# Patient Record
Sex: MALE | Race: WHITE | NOT HISPANIC OR LATINO | Employment: FULL TIME | URBAN - METROPOLITAN AREA
[De-identification: names, ages, dates, MRNs, and addresses within clinical notes are randomized per-mention and may not be internally consistent; named-entity substitution may affect disease eponyms.]

---

## 2020-11-12 ENCOUNTER — VIRTUAL VISIT (OUTPATIENT)
Dept: FAMILY MEDICINE | Facility: OTHER | Age: 55
End: 2020-11-12
Payer: COMMERCIAL

## 2020-11-12 DIAGNOSIS — Z20.822 SUSPECTED COVID-19 VIRUS INFECTION: Primary | ICD-10-CM

## 2020-11-12 PROCEDURE — 99421 OL DIG E/M SVC 5-10 MIN: CPT | Performed by: FAMILY MEDICINE

## 2020-11-12 NOTE — PROGRESS NOTES
"Date: 2020 08:49:11  Clinician: Meng Ko  Clinician NPI: [id number]  Patient: thanh burgos  Patient : 1965  Patient Address: [address] [place] [address] [place] [address]  Patient Phone: [phone]  Visit Protocol: URI  Patient Summary:  thanh is a 54 year old ( : 1965 ) male who initiated a [facility] Visit for COVID-19 (Coronavirus) evaluation and screening. When asked the question \"Please sign me up to receive news, health information and promotions from [facility].\", thanh responded \"Yes\".    When asked when his symptoms started, thanh reported that he does not have any symptoms.   He denies having recent facial or sinus surgery in the past 60 days.    Pertinent COVID-19 (Coronavirus) information  thanh does not work or volunteer as healthcare worker or a . In the past 14 days, thanh has not worked or volunteered at a healthcare facility or group living setting.   In the past 14 days, he also has not lived in a congregate living setting.   thanh has had a close contact with a laboratory-confirmed COVID-19 patient in the last 14 days. He was exposed at his work. Date thanh was exposed to the laboratory-confirmed COVID-19 patient: 2020   Additional information about contact with COVID-19 (Coronavirus) patient as reported by the patient (free text): infected person came into the lunchroom without a mask while I was eating   thanh is not living in the same household with the COVID-19 positive patient. He was in an enclosed space for greater than 15 minutes with the COVID-19 patient.   During the encounter, neither were wearing masks.   Since 2019, thanh has not been tested for COVID-19 and has not had upper respiratory infection or influenza-like illness.   Pertinent medical history  thanh has taken an antibiotic medication in the past month. Antibiotic details as reported by the patient (free text): amoxicillin   thanh does not need a return to work/school note.   Weight: 168 lbs   thanh does " not smoke or use smokeless tobacco.   Weight: 168 lbs    MEDICATIONS: amoxicillin oral, ALLERGIES: NKDA  Clinician Response:  Dear thanh,   Based on your exposure to COVID-19 (coronavirus), we would like to test you for this virus.  1. Please call [phone] to schedule your visit. Explain that you were referred by [facility] to have a COVID-19 test. Be [facility].  * If you need to schedule in [facility] please call [phone] or for [employer] employees please call [phone].   * If you need to schedule in the [place] area please call [phone]. [place] employees call [phone].   The following will serve as your written order for this COVID Test, ordered by me, for the indication of suspected COVID [Z20.828]: The test will be ordered in [facility], our electronic health record, after you are scheduled. It will show as ordered and authorized by Ga Vera MD.  Order: COVID-19 (coronavirus) PCR for ASYMPTOMATIC EXPOSURE testing from [facility].   If you know you have had close contact with someone who tested positive, you should be quarantined for 14 days after this exposure. You should stay in quarantine for the14 days even if the covid test is negative, the optimal time to test after exposure is 5-7 days from the exposure  Quarantine means   What should I do?  For safety, it's very important to follow these rules. Do this for 14 days after the date you were last exposed to the virus..  Stay home and away from others. Don't go to school or anywhere else. Generally quarantine means staying home from work but there are some exceptions to this. Please contact your workplace.   No hugging, kissing or shaking hands.  Don't let anyone visit.  Cover your mouth and nose with a mask, tissue or washcloth to avoid spreading germs.  Wash your hands and face often. Use soap and water.  What are the symptoms of COVID-19?  The most common symptoms are cough, fever and trouble breathing. Less common  symptoms include headache, body aches, fatigue (feeling very tired), chills, sore throat, stuffy or runny nose, diarrhea (loose poop), loss of taste or smell, belly pain, and nausea or vomiting (feeling sick to your stomach or throwing up).  After 14 days, if you have still don't have symptoms, you likely don't have this virus.  If you develop symptoms, follow these guidelines.  If you're normally healthy: Please start another [facility] visit to report your symptoms. Go to [email or web address].  If you have a serious health problem (like cancer, heart failure, an organ transplant or kidney disease): Call your specialty clinic. Let them know that you might have COVID-19.  2. When it's time for your COVID test:  Stay at least 6 feet away from others. (If someone will drive you to your test, stay in the backseat, as far away from the  as you can.)  Cover your mouth and nose with a mask, tissue or washcloth.  Go straight to the testing site. Don't make any stops on the way there or back.  Please note  Caregivers in these groups are at risk for severe illness due to COVID-19:  o People 65 years and older  o People who live in a nursing home or long-term care facility  o People with chronic disease (lung, heart, cancer, diabetes, kidney, liver, immunologic)  o People who have a weakened immune system, including those who:  Are in cancer treatment  Take medicine that weakens the immune system, such as corticosteroids  Had a bone marrow or organ transplant  Have an immune deficiency  Have poorly controlled HIV or AIDS  Are obese (body mass index of 40 or higher)  Smoke regularly  Where can I get more information?   [employer] -- About COVID-19: [email or web address]  CDC -- What to Do If You're Sick: www.cdc.gov/coronavirus/2019-ncov/about/steps-when-sick.html  CDC -- Ending Home Isolation: www.cdc.gov/coronavirus/2019-ncov/hcp/disposition-in-home-patients.html  CDC -- Caring for Someone:  www.cdc.gov/coronavirus/2019-ncov/if-you-are-sick/care-for-someone.html  [facility] -- Interim Guidance for Hospital Discharge to Home: www.health.[facility].[place]/diseases/coronavirus/hcp/hospdischarge.pdf  [facility] clinical trials (COVID-19 research studies): clinicalaffairs.[facility]-clinical-trials  Below are the COVID-19 hotlines at the [place] Department of Health ([facility]). Interpreters are available.  For health questions: Call 888-180-0735 or 1-107.203.6057 (7 a.m. to 7 p.m.)  For questions about schools and childcare: Call 365-937-5997 or 1-998.673.6858 (7 a.m. to 7 p.m.)    Diagnosis: Contact with and (suspected) exposure to other viral communicable diseases  Diagnosis ICD: Z20.828

## 2020-11-13 DIAGNOSIS — Z20.822 SUSPECTED COVID-19 VIRUS INFECTION: ICD-10-CM

## 2020-11-13 DIAGNOSIS — Z20.822 SUSPECTED 2019 NOVEL CORONAVIRUS INFECTION: Primary | ICD-10-CM

## 2020-11-13 PROCEDURE — U0003 INFECTIOUS AGENT DETECTION BY NUCLEIC ACID (DNA OR RNA); SEVERE ACUTE RESPIRATORY SYNDROME CORONAVIRUS 2 (SARS-COV-2) (CORONAVIRUS DISEASE [COVID-19]), AMPLIFIED PROBE TECHNIQUE, MAKING USE OF HIGH THROUGHPUT TECHNOLOGIES AS DESCRIBED BY CMS-2020-01-R: HCPCS | Performed by: FAMILY MEDICINE

## 2020-11-14 LAB
SARS-COV-2 RNA SPEC QL NAA+PROBE: NOT DETECTED
SPECIMEN SOURCE: NORMAL

## 2021-04-15 ENCOUNTER — NURSE TRIAGE (OUTPATIENT)
Dept: NURSING | Facility: CLINIC | Age: 56
End: 2021-04-15

## 2021-04-15 NOTE — TELEPHONE ENCOUNTER
Urinary issue. Unable to empty bladder. This started about two weeks ago. He can go some but is unable to empty bladder, still feels like there's urine in there.   He will go to the ER after work today. I explained, that is where they have the urinary catheters for draining his bladder.  Leslie Payne RN  [facility]      Reason for Disposition    [1] Unable to urinate (or only a few drops) > 4 hours AND     [2] bladder feels very full (e.g., palpable bladder or strong urge to urinate)    Additional Information    Negative: Shock suspected (e.g., cold/pale/clammy skin, too weak to stand, low BP, rapid pulse)    Negative: Sounds like a life-threatening emergency to the triager    Negative: Followed a genital area injury    Negative: Followed a genital area injury (penis, scrotum)    Negative: Vaginal discharge    Negative: Pus (white, yellow) or bloody discharge from end of penis    Negative: [1] Taking antibiotic for urinary tract infection (UTI) AND [2] female    Negative: [1] Taking antibiotic for urinary tract infection (UTI) AND [2] male    Negative: [1] Discomfort (pain, burning or stinging) when passing urine AND [2] pregnant    Negative: [1] Discomfort (pain, burning or stinging) when passing urine AND [2] postpartum (from 0 to 6 weeks after delivery)    Negative: [1] Discomfort (pain, burning or stinging) when passing urine AND [2] female    Negative: [1] Discomfort (pain, burning or stinging) when passing urine AND [2] male    Negative: Pain or itching in the vulvar area    Negative: Pain in scrotum is main symptom    Negative: Blood in the urine is main symptom    Negative: Symptoms arising from use of a urinary catheter (Vasques or Coude)    Protocols used: URINARY SYMPTOMS-A-AH

## 2021-04-16 ENCOUNTER — HOSPITAL ENCOUNTER (EMERGENCY)
Facility: CLINIC | Age: 56
Discharge: HOME | End: 2021-04-16
Attending: NURSE PRACTITIONER | Admitting: NURSE PRACTITIONER
Payer: COMMERCIAL

## 2021-04-16 VITALS
TEMPERATURE: 97.8 F | SYSTOLIC BLOOD PRESSURE: 125 MMHG | HEART RATE: 62 BPM | RESPIRATION RATE: 16 BRPM | DIASTOLIC BLOOD PRESSURE: 75 MMHG

## 2021-04-16 DIAGNOSIS — R35.0 URINARY FREQUENCY: ICD-10-CM

## 2021-04-16 DIAGNOSIS — N40.0 PROSTATE HYPERTROPHY: ICD-10-CM

## 2021-04-16 PROBLEM — F60.6 AVOIDANT PERSONALITY DISORDER (H): Status: ACTIVE | Noted: 2018-06-08

## 2021-04-16 PROBLEM — F33.0 MAJOR DEPRESSIVE DISORDER, RECURRENT EPISODE, MILD (H): Status: ACTIVE | Noted: 2018-01-29

## 2021-04-16 PROBLEM — F41.9 ANXIETY DISORDER: Status: ACTIVE | Noted: 2018-01-29

## 2021-04-16 PROBLEM — F40.10 SOCIAL ANXIETY DISORDER: Status: ACTIVE | Noted: 2018-06-08

## 2021-04-16 LAB
ALBUMIN UR-MCNC: NEGATIVE MG/DL
ANION GAP SERPL CALCULATED.3IONS-SCNC: 2 MMOL/L (ref 3–14)
APPEARANCE UR: CLEAR
BASOPHILS # BLD AUTO: 0 10E9/L (ref 0–0.2)
BASOPHILS NFR BLD AUTO: 0.3 %
BILIRUB UR QL STRIP: NEGATIVE
BUN SERPL-MCNC: 21 MG/DL (ref 7–30)
CALCIUM SERPL-MCNC: 9 MG/DL (ref 8.5–10.1)
CHLORIDE SERPL-SCNC: 107 MMOL/L (ref 94–109)
CO2 SERPL-SCNC: 30 MMOL/L (ref 20–32)
COLOR UR AUTO: NORMAL
CREAT SERPL-MCNC: 1.12 MG/DL (ref 0.66–1.25)
DIFFERENTIAL METHOD BLD: NORMAL
EOSINOPHIL # BLD AUTO: 0 10E9/L (ref 0–0.7)
EOSINOPHIL NFR BLD AUTO: 0.4 %
ERYTHROCYTE [DISTWIDTH] IN BLOOD BY AUTOMATED COUNT: 13.2 % (ref 10–15)
GFR SERPL CREATININE-BSD FRML MDRD: 73 ML/MIN/{1.73_M2}
GLUCOSE SERPL-MCNC: 86 MG/DL (ref 70–99)
GLUCOSE UR STRIP-MCNC: NEGATIVE MG/DL
HCT VFR BLD AUTO: 41.3 % (ref 40–53)
HGB BLD-MCNC: 13.9 G/DL (ref 13.3–17.7)
HGB UR QL STRIP: NEGATIVE
IMM GRANULOCYTES # BLD: 0 10E9/L (ref 0–0.4)
IMM GRANULOCYTES NFR BLD: 0.3 %
KETONES UR STRIP-MCNC: NEGATIVE MG/DL
LEUKOCYTE ESTERASE UR QL STRIP: NEGATIVE
LYMPHOCYTES # BLD AUTO: 3.4 10E9/L (ref 0.8–5.3)
LYMPHOCYTES NFR BLD AUTO: 45.2 %
MCH RBC QN AUTO: 30.8 PG (ref 26.5–33)
MCHC RBC AUTO-ENTMCNC: 33.7 G/DL (ref 31.5–36.5)
MCV RBC AUTO: 92 FL (ref 78–100)
MONOCYTES # BLD AUTO: 0.6 10E9/L (ref 0–1.3)
MONOCYTES NFR BLD AUTO: 7.7 %
NEUTROPHILS # BLD AUTO: 3.5 10E9/L (ref 1.6–8.3)
NEUTROPHILS NFR BLD AUTO: 46.1 %
NITRATE UR QL: NEGATIVE
NRBC # BLD AUTO: 0 10*3/UL
NRBC BLD AUTO-RTO: 0 /100
PH UR STRIP: 6.5 PH (ref 5–7)
PLATELET # BLD AUTO: 237 10E9/L (ref 150–450)
POTASSIUM SERPL-SCNC: 3.8 MMOL/L (ref 3.4–5.3)
RBC # BLD AUTO: 4.51 10E12/L (ref 4.4–5.9)
RBC #/AREA URNS AUTO: 1 /HPF (ref 0–2)
SODIUM SERPL-SCNC: 139 MMOL/L (ref 133–144)
SOURCE: NORMAL
SP GR UR STRIP: 1.01 (ref 1–1.03)
SQUAMOUS #/AREA URNS AUTO: 0 /HPF (ref 0–1)
UROBILINOGEN UR STRIP-MCNC: 0 MG/DL (ref 0–2)
WBC # BLD AUTO: 7.5 10E9/L (ref 4–11)
WBC #/AREA URNS AUTO: <1 /HPF (ref 0–5)

## 2021-04-16 PROCEDURE — 99283 EMERGENCY DEPT VISIT LOW MDM: CPT

## 2021-04-16 PROCEDURE — 80048 BASIC METABOLIC PNL TOTAL CA: CPT | Performed by: NURSE PRACTITIONER

## 2021-04-16 PROCEDURE — 81001 URINALYSIS AUTO W/SCOPE: CPT | Performed by: EMERGENCY MEDICINE

## 2021-04-16 PROCEDURE — 51798 US URINE CAPACITY MEASURE: CPT

## 2021-04-16 PROCEDURE — 85025 COMPLETE CBC W/AUTO DIFF WBC: CPT | Performed by: NURSE PRACTITIONER

## 2021-04-16 RX ORDER — TAMSULOSIN HYDROCHLORIDE 0.4 MG/1
0.4 CAPSULE ORAL DAILY
Qty: 10 CAPSULE | Refills: 0 | Status: SHIPPED | OUTPATIENT
Start: 2021-04-16 | End: 2021-04-26

## 2021-04-16 ASSESSMENT — ENCOUNTER SYMPTOMS
DIARRHEA: 0
NAUSEA: 0
FEVER: 0
VOMITING: 0
CHILLS: 0
DYSURIA: 0

## 2021-04-16 NOTE — ED NOTES
"Patient straight cathed for 200ml. Stated the catheter, \"made me feel the same feeling I have had at home. Like I want to pee and need to pee but nothing comes out.\"   "

## 2021-04-16 NOTE — ED PROVIDER NOTES
History   Chief Complaint:  Urinary frequency       HPI   Pablo Ferrari is a 55 year old male with history of anxiety and depression who presents with urinary frequency. For approximately the past 10 days, the patient states he has been experiencing urinary frequency, stating that he is only able to eliminate small amounts of urine at a time and feels like he must urinate frequently and that he does not empty his bladder.  He does confirm nocturia.  He does note this has had a gradual onset over the last few months but has worsened over the last week to 2 weeks.  He reports that he has had a similar episode in the past but reports that it only lasted for one day. He denies any nausea, vomiting, diarrhea, fever, chills, dysuria, or any other symptoms.  He denies risk for sexually transmitted infection and denies penile discharge or rash.    Review of Systems   Constitutional: Negative for chills and fever.   Gastrointestinal: Negative for diarrhea, nausea and vomiting.   Genitourinary: Positive for decreased urine volume. Negative for dysuria.   All other systems reviewed and are negative.    Allergies:  The patient has no known allergies.     Medications:  Omeprazole    Past Medical History:    Social anxiety disorder  Avoidant personality disorder  Major depressive disorder  Anxiety   Caries  Gum disease    Social History:  The patient presents alone and denies any drug use.    Physical Exam     Patient Vitals for the past 24 hrs:   BP Temp Temp src Pulse Resp   04/16/21 1415 121/73 97.8  F (36.6  C) Temporal 62 16       Physical Exam  Nursing notes reviewed. Vitals reviewed.  General: Alert. Well kept.  Eyes:  Conjunctiva non-injected, non-icteric.  Neck/Throat: Moist mucous membranes. Normal voice.  Cardiac: Regular rhythm. Normal heart sounds.  Pulmonary: Clear and equal breath sounds bilaterally.   Abdomen: soft and non-tender  : evaluation performed in presence of chaperone.  Normal rectal tone.  Enlarged  nontender prostate.  Musculoskeletal: Normal gross range of motion of all 4 extremities.   Neurological: Alert and oriented x4.   Skin: Warm and dry. Normal appearance of visualized exposed skin without rashes or petechiae.  Psych: Affect normal. Good eye contact.    Emergency Department Course     Laboratory:     CBC: WBC 7.5, HGB 13.9,      BMP: Anion Gap 2 (L), o/w WNL (Creatinine 1.12)     UA with microscopic:  AWNL     Emergency Department Course:    Reviewed:  I reviewed nursing notes, vitals, past medical history and care everywhere.    Assessments:  1600 I obtained history and examined the patient as noted above.     1644 A nurse informed me that the patient received a straight catheter for 200 mL.    1712 I rechecked the patient and explained findings.     Disposition:  The patient was discharged to home.       Impression & Plan   Medical Decision Making:  Pablo Ferrari is a 55 year old male with history of anxiety and depression who presents with urinary frequency.  Urinalysis today shows no sign of infection.  BMP shows normal kidney function.  Patient has an enlarged prostate without tenderness or signs of prostatitis on digital rectal exam.  Post void residual bladder scan is less than 60 cc.  Patient was straight cathed for 200 cc of urine sent for evaluation.  The patient has had gradual onset of symptoms with worsening over the last 1 to 2 weeks.  There is no indication for indwelling Vasques catheter at this time with post void residual less than 100 cc.  Patient will be started on alpha 1 blocker and was referred to urology.  Patient will return to the emergency department with no urination in 6 hours, fever, pelvic pain, concerns.    Covid-19  Pablo Ferrari was evaluated during a global COVID-19 pandemic, which necessitated consideration that the patient might be at risk for infection with the SARS-CoV-2 virus that causes COVID-19.   Applicable protocols for evaluation were followed during the  patient's care.       Diagnosis:    ICD-10-CM    1. Urinary frequency  R35.0    2. Prostate hypertrophy  N40.0      Discharge Medications:  New Prescriptions    TAMSULOSIN (FLOMAX) 0.4 MG CAPSULE    Take 1 capsule (0.4 mg) by mouth daily for 10 doses       Scribe Disclosure:  Dustin CALDERON, am serving as a scribe at 4:00 PM on 4/16/2021 to document services personally performed by Ciara Walsh CNP based on my observations and the provider's statements to me.            [place], Ciara, CNP  04/16/21 9978

## 2021-05-17 ENCOUNTER — HOSPITAL ENCOUNTER (EMERGENCY)
Facility: CLINIC | Age: 56
Discharge: HOME | End: 2021-05-17
Attending: EMERGENCY MEDICINE | Admitting: EMERGENCY MEDICINE
Payer: COMMERCIAL

## 2021-05-17 VITALS
DIASTOLIC BLOOD PRESSURE: 74 MMHG | OXYGEN SATURATION: 99 % | TEMPERATURE: 97.7 F | SYSTOLIC BLOOD PRESSURE: 107 MMHG | HEART RATE: 72 BPM | RESPIRATION RATE: 18 BRPM

## 2021-05-17 DIAGNOSIS — R39.9 LOWER URINARY TRACT SYMPTOMS (LUTS): ICD-10-CM

## 2021-05-17 LAB
ALBUMIN UR-MCNC: NEGATIVE MG/DL
APPEARANCE UR: CLEAR
BILIRUB UR QL STRIP: NEGATIVE
COLOR UR AUTO: ABNORMAL
GLUCOSE UR STRIP-MCNC: NEGATIVE MG/DL
HGB UR QL STRIP: NEGATIVE
KETONES UR STRIP-MCNC: NEGATIVE MG/DL
LEUKOCYTE ESTERASE UR QL STRIP: NEGATIVE
NITRATE UR QL: NEGATIVE
PH UR STRIP: 7.5 PH (ref 5–7)
RBC #/AREA URNS AUTO: 0 /HPF (ref 0–2)
SOURCE: ABNORMAL
SP GR UR STRIP: 1 (ref 1–1.03)
SQUAMOUS #/AREA URNS AUTO: 0 /HPF (ref 0–1)
UROBILINOGEN UR STRIP-MCNC: 0 MG/DL (ref 0–2)
WBC #/AREA URNS AUTO: 0 /HPF (ref 0–5)

## 2021-05-17 PROCEDURE — 87591 N.GONORRHOEAE DNA AMP PROB: CPT | Performed by: EMERGENCY MEDICINE

## 2021-05-17 PROCEDURE — 81001 URINALYSIS AUTO W/SCOPE: CPT | Performed by: EMERGENCY MEDICINE

## 2021-05-17 PROCEDURE — 87661 TRICHOMONAS VAGINALIS AMPLIF: CPT | Performed by: EMERGENCY MEDICINE

## 2021-05-17 PROCEDURE — 99284 EMERGENCY DEPT VISIT MOD MDM: CPT | Mod: 25

## 2021-05-17 PROCEDURE — 87491 CHLMYD TRACH DNA AMP PROBE: CPT | Performed by: EMERGENCY MEDICINE

## 2021-05-17 PROCEDURE — 87086 URINE CULTURE/COLONY COUNT: CPT | Performed by: EMERGENCY MEDICINE

## 2021-05-17 PROCEDURE — 51798 US URINE CAPACITY MEASURE: CPT

## 2021-05-17 RX ORDER — SULFAMETHOXAZOLE/TRIMETHOPRIM 800-160 MG
1 TABLET ORAL 2 TIMES DAILY
Qty: 28 TABLET | Refills: 0 | Status: SHIPPED | OUTPATIENT
Start: 2021-05-17 | End: 2021-05-31

## 2021-05-17 ASSESSMENT — ENCOUNTER SYMPTOMS
FREQUENCY: 1
BACK PAIN: 0
HEMATURIA: 0
DIFFICULTY URINATING: 1
DIARRHEA: 0
NAUSEA: 0
DYSURIA: 0
WEAKNESS: 0
FEVER: 0
VOMITING: 0
FLANK PAIN: 0
ABDOMINAL PAIN: 0
CHILLS: 0
NUMBNESS: 0

## 2021-05-17 NOTE — ED PROVIDER NOTES
History   Chief Complaint:  Urinary Retention     The history is provided by the patient and medical records.      Pablo Ferrari is a 55 year old male who presents with urinary retention. To note, the patient was seen here about a month ago for urinary frequency but difficulty voiding fully. He did not have a UTI at this time and was prescribed a dose of Flomax, which he believes improved his symptoms. His symptoms were better for a week or two then re-developed, though he initially thought it was a pulled muscle. Since then, he continues to feel the urge to urinate often, though has been having troubles voiding. He also complains of some pain in his groin and intermittent pain in his testicles; worse at night. Due to the persisting and worsening nature of his pain and symptoms, he decided to seek evaluation in the ED.     Patient denies fever, chills, back pain, abdominal pain, nausea, vomiting, diarrhea, painful bowels, numbness, tingling, or weakness in his legs, or being on an antibiotic recently. The patient denies being sexually active or chance of STI. To note, the patient has an appointment with the urologist in the next 2-3 weeks.     Review of Systems   Constitutional: Negative for chills and fever.   Gastrointestinal: Negative for abdominal pain, diarrhea, nausea and vomiting.   Genitourinary: Positive for decreased urine volume, difficulty urinating, frequency and testicular pain. Negative for dysuria, flank pain and hematuria.   Musculoskeletal: Negative for back pain.   Neurological: Negative for weakness and numbness.   All other systems reviewed and are negative.      Allergies:  No Known Allergies    Medications:  The patient is not on any daily medications.     Past Medical History:    Avoidant personality disorder  Social anxiety disorder  Anxiety disorder  Depression       Social History:  Smoking status: Negative  Alcohol use: Positive  Drug use: Negative   Marital Status: Single  Presents to the  ED alone.     Physical Exam     Patient Vitals for the past 24 hrs:   BP Temp Temp src Pulse Resp SpO2   21 1231 114/72 97.7  F (36.5  C) Temporal 79 18 99 %       Physical Exam  Constitutional: Well developed, nontox appearance  Head: Atraumatic.  Neck:  no stridor  Eyes: no scleral icterus  Cardiovascular: RRR, 2+ bilat radial pulses  Pulmonary/Chest: nml resp effort, Clear BS bilat  Abdominal: ND, soft, minimal suprapubic tenderness, no rebound or guarding   : no CVA tenderness bilat  Ext: Warm, well perfused, no edema  Neurological: A&O, symmetric facies, moves ext x4  Skin: Skin is warm and dry.   Psychiatric: Behavior is normal. Thought content normal.   Nursing note and vitals reviewed.    Emergency Department Course   Laboratory:  UA: pH: 7.5 (H), o/w Negative     Urine Culture: Pending    Trichomonas vaginalis PCR: Pending  Chlamydia Trachomatis: Pending  Neisseria Gonorrhea: Pending    Emergency Department Course:    Reviewed:  I reviewed nursing notes and vitals    Assessments:  1256 I obtained history and examined the patient as noted above. The patient was found to have 113 mL of urine in his bladder.     1421 I rechecked the patient and discussed the results of his workup thus far.     Disposition:  The patient was discharged to home.     Impression & Plan   Medical Decision Makin year old male presenting w/ LUTS, suprapubic discomfort     Patient differential diagnosis includes prostatitis (bacterial vs nonbacterial), UTI, urinary retention, LUTS, abd pain NOS.  Rectal and prostate exam deferred given it was done at previous visit and noted to be enlarged.  Doubt spinal cord compromise or cauda equina syndrome.  Bladder scan postvoid residual demonstrated urinary retention (volume greater than 100 mL) but indwelling Vasques catheter is not indicated at this time.  Given no severe urinary retention, do not feel labs are indicated at this time particular given vital signs are within normal  limits and the patient is afebrile.  UA as noted above without significant abnormality.  Is possible the patient is experiencing prostatitis given his constellation of symptoms and this is seemingly more likely nonbacterial.  STI testing added on and pending.  I think would be reasonable to trial the patient on Bactrim given his duration and and persistence of symptoms despite infectious prostatitis being less likely.  He is advised to continue Flomax and follow-up with urology as previously instructed.  Patient reports that he has urology appointment at the beginning of June.  At this time I feel the pt is safe for discharge.  Recommendations given regarding follow up with PCP and return to the emergency department as needed for new or worsening symptoms.  Pt counseled on all results, disposition and diagnosis.  They are understanding and agreeable to plan. Patient discharged in stable condition.      Diagnosis:    ICD-10-CM    1. Lower urinary tract symptoms (LUTS)  R39.9        Discharge Medications:  New Prescriptions    SULFAMETHOXAZOLE-TRIMETHOPRIM (BACTRIM DS) 800-160 MG TABLET    Take 1 tablet by mouth 2 times daily for 14 days       Scribe Disclosure:  IAspen, am serving as a scribe on 5/17/2021 at 12:56 PM to personally document services performed by Abdulkadir Desir MD based on my observations and the provider's statements to me.      Abdulkadir Desir MD  05/18/21 0994

## 2021-05-17 NOTE — ED TRIAGE NOTES
Seen here 2-3 weeks ago for similar thing; pain has gotten worse; voided before coming, but unable to empty bladder with pelvic discomfort

## 2021-05-17 NOTE — DISCHARGE INSTRUCTIONS
Please take antibiotics as prescribed.  Please continue to take your Flomax as previously prescribed.    Please follow-up with urology as scheduled.    Please return to the emergency department as needed for new or worsening symptoms including severe and uncontrolled pain, fever greater than 100.4  F, vomiting and unable to keep anything down, any other concerning symptoms.     -Take acetaminophen 500 to 1000 mg by mouth every 4 to 6 hours as needed for pain or fever.  Do not take more than 4000 mg in 24 hours.  Do not take within 6 hours of another acetaminophen containing medication such as norco (vicodin) or percocet.  - Take ibuprofen 600 to 800 mg by mouth every 6 to 8 hours as needed for pain or fever for 1 week.  If he can regularly, please take an over-the-counter oral antiacid such as pantoprazole daily.

## 2021-05-18 LAB
BACTERIA SPEC CULT: NO GROWTH
C TRACH DNA SPEC QL NAA+PROBE: NEGATIVE
Lab: NORMAL
N GONORRHOEA DNA SPEC QL NAA+PROBE: NEGATIVE
SPECIMEN SOURCE: NORMAL
T VAGINALIS DNA SPEC QL NAA+PROBE: NORMAL

## 2021-05-18 NOTE — RESULT ENCOUNTER NOTE
"Final urine culture report shows \"NO GROWTH\" and is NEGATIVE.  [facility] Emergency Dept discharge antibiotic: Sulfamethoxazole-Trimethoprim (Bactrim DS, Septra DS) 800-160 mg PO tablet, 1 tablet by mouth 2 times daily for 14 days  [facility] Emergency Dept discharge Rx antibiotic for UTI only (Yes/No): No  Patient took antibiotic within 3 days prior to urine culture collection (Yes/no): NA  Recommendations in treatment per [facility] ED Lab result Urine culture protocol.  "

## 2021-05-18 NOTE — RESULT ENCOUNTER NOTE
Final result for both N. Gonorrhoeae PCR and Chlamydia Trachomatis PCR are NEGATIVE.  No treatment or change in treatment per [facility] ED Lab Result N. Gonorrhea AND/OR Chlamydia T. protocol.

## 2021-09-07 ENCOUNTER — FCC EXTENDED DOCUMENTATION (OUTPATIENT)
Dept: PSYCHOLOGY | Facility: CLINIC | Age: 56
End: 2021-09-07

## 2021-09-07 ENCOUNTER — VIRTUAL VISIT (OUTPATIENT)
Dept: PSYCHOLOGY | Facility: CLINIC | Age: 56
End: 2021-09-07
Payer: COMMERCIAL

## 2021-09-07 DIAGNOSIS — F41.1 GENERALIZED ANXIETY DISORDER: ICD-10-CM

## 2021-09-07 DIAGNOSIS — F34.1: Primary | ICD-10-CM

## 2021-09-07 DIAGNOSIS — Z13.39 ATTENTION DEFICIT HYPERACTIVITY DISORDER (ADHD) EVALUATION: ICD-10-CM

## 2021-09-07 PROCEDURE — 90834 PSYTX W PT 45 MINUTES: CPT | Mod: 95 | Performed by: PSYCHOLOGIST

## 2021-09-07 ASSESSMENT — ANXIETY QUESTIONNAIRES
GAD7 TOTAL SCORE: 7
8. IF YOU CHECKED OFF ANY PROBLEMS, HOW DIFFICULT HAVE THESE MADE IT FOR YOU TO DO YOUR WORK, TAKE CARE OF THINGS AT HOME, OR GET ALONG WITH OTHER PEOPLE?: SOMEWHAT DIFFICULT
2. NOT BEING ABLE TO STOP OR CONTROL WORRYING: SEVERAL DAYS
1. FEELING NERVOUS, ANXIOUS, OR ON EDGE: SEVERAL DAYS
7. FEELING AFRAID AS IF SOMETHING AWFUL MIGHT HAPPEN: SEVERAL DAYS
3. WORRYING TOO MUCH ABOUT DIFFERENT THINGS: SEVERAL DAYS
7. FEELING AFRAID AS IF SOMETHING AWFUL MIGHT HAPPEN: SEVERAL DAYS
GAD7 TOTAL SCORE: 7
5. BEING SO RESTLESS THAT IT IS HARD TO SIT STILL: NOT AT ALL
6. BECOMING EASILY ANNOYED OR IRRITABLE: MORE THAN HALF THE DAYS
GAD7 TOTAL SCORE: 7
4. TROUBLE RELAXING: SEVERAL DAYS

## 2021-09-07 ASSESSMENT — PATIENT HEALTH QUESTIONNAIRE - PHQ9
SUM OF ALL RESPONSES TO PHQ QUESTIONS 1-9: 7
10. IF YOU CHECKED OFF ANY PROBLEMS, HOW DIFFICULT HAVE THESE PROBLEMS MADE IT FOR YOU TO DO YOUR WORK, TAKE CARE OF THINGS AT HOME, OR GET ALONG WITH OTHER PEOPLE: SOMEWHAT DIFFICULT
SUM OF ALL RESPONSES TO PHQ QUESTIONS 1-9: 7

## 2021-09-07 ASSESSMENT — COLUMBIA-SUICIDE SEVERITY RATING SCALE - C-SSRS: 1. IN THE PAST MONTH, HAVE YOU WISHED YOU WERE DEAD OR WISHED YOU COULD GO TO SLEEP AND NOT WAKE UP?: NO

## 2021-09-07 NOTE — PROGRESS NOTES
[facility]  Provider Name:  Gregoria Mota      Credentials:  Carroll DAMON    PATIENT'S NAME: Pablo Ferrari  PREFERRED NAME: Pablo  PRONOUNS:      He/Him/His  MRN: [id number]  : 1965  ADDRESS: [address]  [facility] [address]  ACCT. NUMBER:  [id number]  DATE OF SERVICE: 9/15/2021   START TIME: 8:00AM  END TIME: 8:55AM  PREFERRED PHONE: [phone]  May we leave a program related message: Yes  SERVICE MODALITY:  Video Visit:      Provider verified identity through the following two step process.  Patient provided:  Patient     Telemedicine Visit: The patient's condition can be safely assessed and treated via synchronous audio and visual telemedicine encounter.      Reason for Telemedicine Visit: Services only offered telehealth    Originating Site (Patient Location): Patient's home    Distant Site (Provider Location): Provider Remote Setting- Home Office    Consent:  The patient/guardian has verbally consented to: the potential risks and benefits of telemedicine (video visit) versus in person care; bill my insurance or make self-payment for services provided; and responsibility for payment of non-covered services.     Patient would like the video invitation sent by:  Send to e-mail at: [email or web address]    Mode of Communication:  Video Conference via [facility]    As the provider I attest to compliance with applicable laws and regulations related to telemedicine.          UNIVERSAL ADULT Mental Health DIAGNOSTIC ASSESSMENT    Identifying Information:  Patient is a 55 year old,.  The pronoun use throughout this assessment reflects the patient's chosen pronoun.  Patient was referred for an assessment by self.  Patient attended the session alone. Patient's current PCP is    Meng Ko DO  Family Medicine         Chief Complaint:   The purpose of this evaluation is to: provide treatment recommendations and clarify diagnosis. Patient reported seeking services at this time for  "diagnostic assessment and recommendations for treatment.  Patient reported that he has not completed a previous ADHD diagnostic assessment.  Patient has received a previous diagnosis of Depression, Anxiety, Social Anxiety, and Avoidant Personality Disorder. Patient reported that he has never taken psychotropic medication. Patient reported that he saw a therapist in college and he saw a therapist in 2018 for approximately one year through [facility].    Patient reported the following symptoms: difficulty with task completion, motivation to begin tasks, forgetful, and easily distracted. Patient reported that his symptoms began as early as [employer] and worsened in college.       Social/Family History:  Patient reported they grew up in [place].  They were raised by biological parents  .  Parents were always together.  Patient reported that their childhood was \"fine.\" Patient reported that he was \"bullied a lot\" at school and his parents did not address issues at home. Patient reported that his father disapproved \"of fighting\" so the patient was \"viewed as an easy target.\"  Patient described their current relationships with family of origin as close with his parents. Patient reported that he is the oldest of two children. Patient reported that he speaks with his sister regularly.     Patient described his childhood family environment as nurturing and stable.  As a child, patient reported that he failed to complete assigned chores in the home environment, had problems getting ready for school in the morning, had problems with organization and keeping track of items, misplaced or lost things and had problems managing temper with frequent emotional outbursts. Patient reported difficulty with childhood peer relationships. Patient reported that as a child, he would often become distracted with trying to get ready for bed.     The patient describes their cultural background as .  Cultural " "influences and impact on patient's life structure, values, norms, and healthcare: \"nobody talked about mental health in those days.\" Patient reported that he had a bee sting allergy and so he \"got a shot once a week for about one year.\"  Contextual influences on patient's health include: Family Factors seeks help when needed and Health- Seeking Factors seeks help when needed.    These factors will be addressed in the Preliminary Treatment plan.  Patient identified their preferred language to be English. Patient reported they does not need the assistance of an  or other support involved in therapy.     Patient reported experienced significant delays in developmental tasks, such as attention issues.   Patient's highest education level was some college. Patient reported that he attended approximately three years of college. Patient reported that he did \"fairly well at first\" and then did not go to class and had trouble with concentration. Patient reported that he would \"get a bit behind on the work and then would get freaked out and stop going to the class.\" Patient reported that he was attending the [employer] and then \"was asked to leave, because of grades.\" Patient said  \"I had a hard time making myself do the homework and my grades got increasingly worse and worse.\" He reported that he did not do the homework and \"there would be so much that I would be freaked out and then I would stop going to class.\" He reported that he would then fail the class or drop out.  Patient reported that he attended \"some extension classes at the  and classes at [employer]\" and he would \"fall apart after a while.\" Patient reported feeling \"pressure\" and that he \"could not keep up\" with assignments in college. Patient reported that he went to college on and off for \"probably for 15 years and then gave up for good.\" Patient reported that he often procrastinated with assignments, felt pressure, and then would \"freak out and stop " "going to class.\"    Patient identified the following learning problems: attention and concentration.  Modifications will not be used to assist communication in therapy.  Patient reports they are able to understand written materials. Patient did not receive tutoring services during the school years. Patient did not receive special education services. Patient  reported experiencing trouble with completing assignments in high school, but not in college. Patient reported that in high school, he would do most of his work during class.  Patient did attend post secondary school. Patient reported that he went to a public school and in high school, he \"was able to get by without a lot of work.\"    Patient reported the following relationship history no long term partners.  Patient's current relationship status is single for life. Patient identified their sexual orientation as heterosexual.  Patient reported having o child(eloina). Patient identified his parents and sister as part of their support system.  Patient identified the quality of these relationships as good.       Patient's current living/housing situation involves staying in own home/apartment. Patient lives alone and they report that housing is stable.    Patient is currently employed full time.  Patient reports their finances are obtained through employment. 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  The longest period of employment has been 35 years.  Client has been terminated from a place of employment. Patient reported that he was terminated due to mistakes due to inattention. Patient does not identify finances as a current stressor.        ADHD Related History:   As a child, Patient reported having sleep disturbance, including: daytime drowsiness / fatigue, insomnia and difficulty stopping reading to go to sleep . Patient reported currently experiencing sleep disturbance, including: insomnia, snoring and teeth grinding.  Client reported " "sleeping approximately 6.5-7.5 hours per night.  Patient  reported that he has completed a sleep study. Patient reported that the results were inconclusive; he reported that they said he sleeps \"inefficiently.\"  Patient reported having a well balanced diet, cravings for sweets and sometimes binges on sweets. Patient reported that he does not have concerns for his weight or diabetes. There are not significant nutritional concerns.  Patient reported engaging in regular exercise.    Patient graduated high school in  with approximately a 2.7 GPA.  During the elementary, middle, and high school years, patient recalls academic strengths in the area of math. Patient reported experiencing academic problems in no areas recalled.  Patient reported that he tended to complete his work at school.  Patient said \"I have bad working memory.\" Patient reported that he often forgets items at the grocery store. Patient reported that he recently went to see his doctor and forgot to talk about one of his medical issues.     Patient reported that he been frequently late for work, frequently made mistakes with poor attention to detail, often felt bored, often been late in completing projects, disorganized behavior and distractible behavior .  Patient reported that when he started with his employer in the , his work shift was at 8:00AM. Patient reported that there was not a formal conversation, but due to him routinely showing up later, he usually started around 9:00AM or 9:15AM.     Patient reported that they have not been involved with the legal system. Patient does not being under probation/ parole/ jurisdiction. They are not under any current court jurisdiction. .    Patient has received a 's license.  Patient has received moving violations, includin speeding tickets. Patient reported that when he was \"young\" he would drive \"too fast\" his \"attention would wander\" and then he would get into accidents.   Patient " reported the following driving habits: fails to obey traffic signs and laws, frequently late for appointments, meetings, or work, gets lost easily and often exceeds the speed limit / speeds.  According to client, he is unsure if people are comfortable riding as passengers when he is driving, because he rarely has passengers in his car.     Patient's Strengths and Limitations:  Patient identified the following strengths or resources that will help them succeed in treatment: commitment to health and well being and friends / good social support. Things that may interfere with the patient's success in treatment include: none identified.     Personal and Family Medical History:  Patient does report a family history of mental health concerns.  Patient reports family history is not on file.    Patient reported that his mother has depression and anxiety. Patient reported that he wonders if his mother has ADHD.    Patient does report Mental Health Diagnosis and/or Treatment.  Patient reported the following previous diagnoses which include(s): Depression, Anxiety, Social Anxiety, and Avoidant Personality Disorder. Patient reported symptoms began in elementary school or [employer].   Patient has received mental health services in the past: [facility].  Psychiatric Hospitalizations: None.  Patient denies a history of civil commitment.  Patient is not receiving other mental health services.        Patient has had a physical exam to rule out medical causes for current symptoms.  Date of last physical exam was within the past year. Client was encouraged to follow up with PCP if symptoms were to develop. The patient has a [place] Primary Care Provider, who is named No Ref-Primary, Physician.     Meng Ko, DO    Family Medicine           Patient reports the following current medical concerns: bladder issue and the following current dental concerns: teeth grinding and his mouth guard is not  comfortable.  Patient denies any issues with pain.  There are not significant appetite / nutritional concerns / weight changes.   Patient does not report a history of head injury / trauma / cognitive impairment.      Oxybutynin Chloride     Medication Adherence:  Patient reports taking.    Patient does not have psychotropic medications.     Patient Allergies:  No Known Allergies    Medical History:  No past medical history on file.      Current Mental Status Exam:   Appearance:  Appropriate    Eye Contact:  Good   Psychomotor:  Normal       Gait / station:  no problem  Attitude / Demeanor: Cooperative   Speech      Rate / Production: Normal/ Responsive      Volume:  Normal  volume      Language:  intact  Mood:   Euthymic  Affect:   Flat    Thought Content: Clear   Thought Process: Coherent       Associations: No loosening of associations  Insight:   Good   Judgment:  Intact   Orientation:  All  Attention/concentration: Easily Distracted    Rating Scales:    PHQ9:    PHQ-9 SCORE 9/7/2021   PHQ-9 Total Score MyChart 7 (Mild depression)   PHQ-9 Total Score 7       GAD7:    SUN-7 SCORE 9/7/2021   Total Score 7 (mild anxiety)   Total Score 7     CGI:     First:Considering your total clinical experience with this particular patient population, how severe are the patient's symptoms at this time?: 3 (9/7/2021  8:18 AM)      Most recent Compared to the patient's condition at the START of treatment, this patient's condition is: 4 (9/7/2021  8:18 AM)      Substance Use:  Patient did not report a family history of substance use concerns; see medical history section for details.  Patient has not received chemical dependency treatment in the past.  Patient has not ever been to detox.      Patient is not currently receiving any chemical dependency treatment.           Substance History of use Age of first use Date of last use     Pattern and duration of use (include amounts and frequency)   Alcohol currently use   20 09/02/21  Patient reported that he tends to drink approximately two alcohol drinks every two weeks.      Cannabis   never used     NA     Amphetamines   never used     NA   Cocaine/crack    never used       NA   Hallucinogens never used         NA   Inhalants never used         NA   Heroin never used         NA   Other Opiates never used     NA   Benzodiazepine   never used     NA   Barbiturates never used     NA   Over the counter meds used in the past 0 01/01/00   Patient reported that he took over the counter medication as prescribed.   Caffeine currently use 17   Patient reported that he consumes approximately 2 cups of coffee and 3 cups of tea each day.    Nicotine  never used     NA   Other substances not listed above:  Identify:  never used     NA     Patient reported the following problems as a result of their substance use: no problems, not applicable.     CAGE- AID:    CAGE-AID Total Score 9/7/2021   Total Score 1   Total Score MyChart 1 (A total score of 2 or greater is considered clinically significant)       Substance Use: Patient reported that he may have headaches or irritability if he stopped drinking caffeine   .  Based on the negative CAGE score and clinical interview there are not indications of drug or alcohol abuse.       Significant Losses / Trauma / Abuse / Neglect Issues:   Patient did not serve in the .  There are indications or report of significant loss, trauma, abuse or neglect issues related to: bullied in [employer].  Concerns for possible neglect are not present.     Safety Assessment:   Current Safety Concerns:  [place] Suicide Severity Rating Scale (Lifetime/Recent)  [place] Suicide Severity Rating (Lifetime/Recent) 9/7/2021   1. Wish to be Dead (Lifetime) No   Has subject engaged in non-suicidal self-injurious behavior? (Lifetime) No     Patient denies current homicidal ideation and behaviors.  Patient denies current self-injurious ideation and behaviors.    Patient denied  "risk behaviors associated with substance use.  Patient reports high risk behaviors associated with mental health symptoms, such as driving \"too fast\"  Patient reports the following current concerns for their personal safety: None.  Patient reports there are not firearms in the house.           History of Safety Concerns:  Patient denied a history of homicidal ideation.     Patient denied a history of personal safety concerns.    Patient denied a history of assaultive behaviors.    Patient denied a history of sexual assault behaviors.     Patient denied a history of risk behaviors associated with substance use.  Patient reported a history of reckless driving associated with mental health symptoms.  Patient reports the following protective factors: effectively controls impulses    Risk Plan:  See Recommendations for Safety and Risk Management Plan    Review of Symptoms per patient report:  Depression: Change in sleep, Lack of interest, Excessive or inappropriate guilt, Change in energy level, Difficulties concentrating and Change in appetite  Myrna:  Irritability  Psychosis: No Symptoms  Anxiety: Excessive worry, Nervousness, Poor concentration and Irritability  Panic:  No symptoms  Post Traumatic Stress Disorder:  No Symptoms   Eating Disorder: No Symptoms  ADD / ADHD:  Inattentive, Difficulties listening, Poor task completion, Poor organizational skills, Distractibility and Forgetful  Conduct Disorder: No symptoms  Autism Spectrum Disorder: Deficits in social communication and social interactions and Deficits in social-emotional reciprocity  Obsessive Compulsive Disorder: No Symptoms    Patient reports the following compulsive behaviors and treatment history: NA     Diagnostic Criteria:     A. Excessive anxiety and worry about a number of events or activities (such as work or school performance).   B. The person finds it difficult to control the worry.  C. Select 3 or more symptoms (required for diagnosis). Only one " item is required in children.   - Being easily fatigued.    - Difficulty concentrating or mind going blank.    - Irritability.   D. The focus of the anxiety and worry is not confined to features of an Axis I disorder.  E. The anxiety, worry, or physical symptoms cause clinically significant distress or impairment in social, occupational, or other important areas of functioning.   F. The disturbance is not due to the direct physiological effects of a substance (e.g., a drug of abuse, a medication) or a general medical condition (e.g., hyperthyroidism) and does not occur exclusively during a Mood Disorder, a Psychotic Disorder, or a Pervasive Developmental Disorder.    - The aformentioned symptoms began 35 year(s) ago and occurs 7 days per week and is experienced as mild.  A. Depressed mood for most of the day, for more days than not, as indicated either by subjective account or observation by others, for at least 2 years. Note: In children and adolescents, mood can be irritable and duration must be at least 1 year.   B. Presence, while depressed, of two (or more) of the following:        - poor appetite or overeating        - low energy or fatigue        - low self-esteem        - poor concentration or difficulty making decisions   C. During the 2-year period (1 year for children or adolescents) of the disturbance, the person has never been without the symptoms in Criteria A and B for more than 2 months at a time.  D. Criteria for a major depressive disorder may be continuously present for 2 years  E. There has never been a Manic Episode, a Mixed Episode, or a Hypomanic Episode, and criteria have never been met for Cyclothymic Disorder.   F. The disturbance is not better explained by a persistent schizoaffective disorder, schizophrenia, delusional disorder, or other specified or unspecified schizophrenia spectrum and other psychotic disorder  G. The symptoms are not attributable to the physiological effects of a  substance (e.g., a drug of abuse, a medication) or another medical condition (e.g., hypothyroidism).   H. The symptoms cause clinically significant distress or impairment in social, occupational, or other important areas of functioning.        - Early Onset: onset is before age 21 years     Functional Status:  Patient reports the following functional impairments: health maintenance, management of the household and or completion of tasks, operation of a motor vehicle, organization, relationship(s), self-care, social interactions and work / vocational responsibilities.         WHODAS:   WHODAS 2.0 Total Score 9/3/2021   Total Score 24   Total Score MyChart 24     Nonprogrammatic care:  Patient is requesting basic services to address current mental health concerns.    Clinical Summary:  1. Reason for assessment: ADHD Assessment  .  2. Psychosocial, Cultural and Contextual Factors: Patient lives alone  .  3. Principal DSM5 Diagnoses  (Sustained by DSM5 Criteria Listed Above):  300.4 (F34.1) Persistent Depressive Disorder, Early onset  300.02 (F41.1) Generalized Anxiety Disorder  4. Other Diagnoses that is relevant to services:   RO ADHD  5. Provisional Diagnosis:  NA  6. Prognosis: Expect Improvement if symptoms are treated.  7. Likely consequences of symptoms if not treated: Symptoms likely to persist and may worsen if not treated.  8. Client strengths include:  employed and has a previous history of therapy .     Recommendations:     1. Plan for Safety and Risk Management:   Recommended that patient call 911 or go to the local ED should there be a change in any of these risk factors..          Report to child / adult protection services was NA.     2. Patient's identified no concerns relevant for the purposes of the ADHD Assessment process.     3. Initial Treatment will focus on:    ADHD Testing:  Patient was given self and collaborative rating scales to be completed prior to the next appointment.  Depression and  anxiety rating scales were completed.  Copies of previous report cards were requested. .     4. Resources/Service Plan:    services are not indicated.   Modifications to assist communication are not indicated.   Additional disability accommodations are not indicated.      5. Collaboration:   Collaboration / coordination of treatment will be initiated with the following  support professionals: primary care physician.      6.  Referrals:   The following referral(s) will be initiated: NA     A Release of Information has been obtained for the following: NA    7. GONSALO:    GONSALO:  Discussed the general effects of drugs and alcohol on health and well-being. Provider gave patient printed information about the effects of chemical use on their health and well being. Recommendations:  Decrease caffeine use.     8. Records:   These were reviewed at time of assessment.   Information in this assessment was obtained from the medical record and  provided by patient who is a good historian.    Patient will have open access to their mental health medical record.      Provider Name/ Credentials:  Gregoria Mota PsyD LP   9/15/2021

## 2021-09-07 NOTE — Clinical Note
Greetings.     I saw your patient today for the first appointment in the ADHD Evaluation and he was diagnosed with anxiety and persistent depressive disorder. I will assess for ADHD and route the final diagnoses and recommendations to you when the evaluation is completed.     Please let me know if you have any questions.     Gregoria Mota PsyD LP

## 2021-09-07 NOTE — PROGRESS NOTES
"                     Progress Note - Initial Visit    Client Name:  Pablo Ferrari Date: 2021          Service Type: Individual     Visit Start Time: 8:03AM  Visit End Time: 8:55AM    Visit #: 1    Attendees: Client attended alone    Service Modality:  Video Visit:      Provider verified identity through the following two step process.  Patient provided:  Patient     Telemedicine Visit: The patient's condition can be safely assessed and treated via synchronous audio and visual telemedicine encounter.      Reason for Telemedicine Visit: Services only offered telehealth    Originating Site (Patient Location): Patient's home    Distant Site (Provider Location): Provider Remote Setting- Home Office    Consent:  The patient/guardian has verbally consented to: the potential risks and benefits of telemedicine (video visit) versus in person care; bill my insurance or make self-payment for services provided; and responsibility for payment of non-covered services.     Patient would like the video invitation sent by:  Send to e-mail at: [email or web address]    Mode of Communication:  Video Conference via [employer]    As the provider I attest to compliance with applicable laws and regulations related to telemedicine.       DATA:   Interactive Complexity: No   Crisis: No     Presenting Concerns/  Current Stressors:     Patient reported difficulty with focusing and being 'inattentive.\" Patient reported that he has is quick to express emotions and has been terminated from a place of employment due to making mistakes due to poor attention to detail.     Reviewed patient's mental health diagnoses listed in his chart:  Avoidant personality disorder (H)  Social anxiety disorder  Anxiety disorder  Major depressive disorder, recurrent episode, mild (H)    Patient expressed feeling \"surprised\" when he read the diagnoses in his chart. Patient reported that he assumes that the diagnoses were from the therapist from [facility] who he " "saw for approximately one year around 2018.     Patient reported experiencing depression on and off since [employer]. Patient reported that his current episode of depression has been \"low\" and persistent for over 2 years.     Patient reported that he lost many friends in [employer], because of \"losing it\" when he would feel emotions.     Patient reported that he is not aware of when the anxiety started. Patient reported that the anxiety may have began in [employer] with the depression or when he was in college.     Patient reported that his social anxiety began in college.     Patient reported that he decreased alcohol use over the past month due to wanting to \"sleep better.\" Patient reported that he drank \"towards the weekends\" and reported that he is not concerned about his current alcohol use.               [facility]. Patient expressed understanding that if patient no shows or cancels with less than 24 hours for an appointment, twice within 6 months, then the patient will not be allowed to schedule for six months.         ASSESSMENT:  Mental Status Assessment:  Appearance:   Appropriate   Eye Contact:   Good   Psychomotor Behavior: Normal   Attitude:   Cooperative   Orientation:   All  Speech   Rate / Production: Normal/ Responsive   Volume:  Normal   Mood:    Anhedonia  Affect:    Appropriate   Thought Content:  Clear   Thought Form:  Goal Directed   Insight:    Good       Safety Issues and Plan for Safety and Risk Management:     [place] Suicide Severity Rating Scale (Lifetime/Recent)  [place] Suicide Severity Rating (Lifetime/Recent) 9/7/2021   1. Wish to be Dead (Lifetime) No   Has subject engaged in non-suicidal self-injurious behavior? (Lifetime) No     Patient denies current fears or concerns for personal safety.  Patient denies current or recent suicidal ideation or behaviors.  Patient denies current or recent homicidal ideation or " behaviors.  Patient denies current or recent self injurious behavior or ideation.  Patient denies other safety concerns.  Recommended that patient call 911 or go to the local ED should there be a change in any of these risk factors.  Patient reports there are no firearms in the house.     Diagnostic Criteria:  A. Excessive anxiety and worry about a number of events or activities (such as work or school performance).   B. The person finds it difficult to control the worry.  C. Select 3 or more symptoms (required for diagnosis). Only one item is required in children.   - Being easily fatigued.    - Difficulty concentrating or mind going blank.    - Irritability.   D. The focus of the anxiety and worry is not confined to features of an Axis I disorder.  E. The anxiety, worry, or physical symptoms cause clinically significant distress or impairment in social, occupational, or other important areas of functioning.   F. The disturbance is not due to the direct physiological effects of a substance (e.g., a drug of abuse, a medication) or a general medical condition (e.g., hyperthyroidism) and does not occur exclusively during a Mood Disorder, a Psychotic Disorder, or a Pervasive Developmental Disorder.    - The aformentioned symptoms began 35 year(s) ago and occurs 7 days per week and is experienced as mild.  A. Depressed mood for most of the day, for more days than not, as indicated either by subjective account or observation by others, for at least 2 years. Note: In children and adolescents, mood can be irritable and duration must be at least 1 year.   B. Presence, while depressed, of two (or more) of the following:        - poor appetite or overeating        - low energy or fatigue        - low self-esteem        - poor concentration or difficulty making decisions   C. During the 2-year period (1 year for children or adolescents) of the disturbance, the person has never been without the symptoms in Criteria A and B for  more than 2 months at a time.  D. Criteria for a major depressive disorder may be continously present for 2 years  E. There has never been a Manic Episode, a Mixed Episode, or a Hypomanic Episode, and criteria have never been met for Cyclothymic Disorder.   F. The disturbance is not better explained by a persistent schizoaffective disorder, schizophrenia, delusional disorder, or other specified or unspecified schizophrenia spectrum and other psychotic disorder  G. The symptoms are not attributable to the physiological effects of a substance (e.g., a drug of abuse, a medication) or another medical condition (e.g., hypothyroidism).   H. The symptoms cause clinically significant distress or impairment in social, occupational, or other important areas of functioning.        - Early Onset: onset is before age 21 years       DSM5 Diagnoses: (Sustained by DSM5 Criteria Listed Above)  Diagnoses: 300.4 (F34.1) Persistent Depressive Disorder, Early onset  300.02 (F41.1) Generalized Anxiety Disorder  Psychosocial & Contextual Factors: Patient lives alone reported that he is not close with many people  WHODAS 2.0 (12 item):   WHODAS 2.0 Total Score 9/3/2021   Total Score 24   Total Score MyChart 24     Interventions:  CBT: socratic questioning, normalizing, positive reinforcement   MI: open ended questions  EFT: emotion checking    Collateral Reports Completed:  Routed note to PCP       PLAN: (Homework, other):  1. Provider will continue Diagnostic Assessment.  Patient was informed that he will be emailed the self and collaborative rating scales to be completed. Depression and anxiety rating scales were completed.  Copies of previous report cards were requested. Patient provided consent to email the questionnaires to [email or web address].           Gregoria Mota Psy.D, LP  September 7, 2021

## 2021-09-08 ASSESSMENT — ANXIETY QUESTIONNAIRES: GAD7 TOTAL SCORE: 7

## 2021-09-08 ASSESSMENT — PATIENT HEALTH QUESTIONNAIRE - PHQ9: SUM OF ALL RESPONSES TO PHQ QUESTIONS 1-9: 7

## 2021-09-15 ENCOUNTER — DOCUMENTATION ONLY (OUTPATIENT)
Dept: PSYCHOLOGY | Facility: CLINIC | Age: 56
End: 2021-09-15
Payer: COMMERCIAL

## 2021-09-15 ENCOUNTER — VIRTUAL VISIT (OUTPATIENT)
Dept: PSYCHOLOGY | Facility: CLINIC | Age: 56
End: 2021-09-15
Payer: COMMERCIAL

## 2021-09-15 DIAGNOSIS — Z13.39 ATTENTION DEFICIT HYPERACTIVITY DISORDER (ADHD) EVALUATION: ICD-10-CM

## 2021-09-15 DIAGNOSIS — F34.1: Primary | ICD-10-CM

## 2021-09-15 DIAGNOSIS — F41.1 GENERALIZED ANXIETY DISORDER: ICD-10-CM

## 2021-09-15 PROCEDURE — 90791 PSYCH DIAGNOSTIC EVALUATION: CPT | Mod: 95 | Performed by: PSYCHOLOGIST

## 2021-09-15 NOTE — PROGRESS NOTES
[facility]  Provider Name:  Gregoria Mota      Credentials:  Carroll DAMON    PATIENT'S NAME: Pablo Ferrari  PREFERRED NAME: Pablo  PRONOUNS:      He/Him/His  MRN: [id number]  : 1965  ADDRESS: [address]  [facility] [address]  ACCT. NUMBER:  [id number]  DATE OF SERVICE: 9/15/2021   START TIME: 8:00AM  END TIME: 8:55AM  PREFERRED PHONE: [phone]  May we leave a program related message: Yes  SERVICE MODALITY:  Video Visit:      Provider verified identity through the following two step process.  Patient provided:  Patient     Telemedicine Visit: The patient's condition can be safely assessed and treated via synchronous audio and visual telemedicine encounter.      Reason for Telemedicine Visit: Services only offered telehealth    Originating Site (Patient Location): Patient's home    Distant Site (Provider Location): Provider Remote Setting- Home Office    Consent:  The patient/guardian has verbally consented to: the potential risks and benefits of telemedicine (video visit) versus in person care; bill my insurance or make self-payment for services provided; and responsibility for payment of non-covered services.     Patient would like the video invitation sent by:  Send to e-mail at: [email or web address]    Mode of Communication:  Video Conference via [facility]    As the provider I attest to compliance with applicable laws and regulations related to telemedicine.          UNIVERSAL ADULT Mental Health DIAGNOSTIC ASSESSMENT    Identifying Information:  Patient is a 55 year old,.  The pronoun use throughout this assessment reflects the patient's chosen pronoun.  Patient was referred for an assessment by self.  Patient attended the session alone. Patient's current PCP is    Meng Ko DO  Family Medicine         Chief Complaint:   The purpose of this evaluation is to: provide treatment recommendations and clarify diagnosis. Patient reported seeking services at this time for  "diagnostic assessment and recommendations for treatment.  Patient reported that he has not completed a previous ADHD diagnostic assessment.  Patient has received a previous diagnosis of Depression, Anxiety, Social Anxiety, and Avoidant Personality Disorder. Patient reported that he has never taken psychotropic medication. Patient reported that he saw a therapist in college and he saw a therapist in 2018 for approximately one year through [facility].    Patient reported the following symptoms: difficulty with task completion, motivation to begin tasks, forgetful, and easily distracted. Patient reported that his symptoms began as early as [employer] and worsened in college.       Social/Family History:  Patient reported they grew up in [place] [place].  They were raised by biological parents  .  Parents were always together.  Patient reported that their childhood was \"fine.\" Patient reported that he was \"bullied a lot\" at school and his parents did not address issues at home. Patient reported that his father disapproved \"of fighting\" so the patient was \"viewed as an easy target.\"  Patient described their current relationships with family of origin as close with his parents. Patient reported that he is the oldest of two children. Patient reported that he speaks with his sister regularly.     Patient described his childhood family environment as nurturing and stable.  As a child, patient reported that he failed to complete assigned chores in the home environment, had problems getting ready for school in the morning, had problems with organization and keeping track of items, misplaced or lost things and had problems managing temper with frequent emotional outbursts. Patient reported difficulty with childhood peer relationships. Patient reported that as a child, he would often become distracted with trying to get ready for bed.     The patient describes their cultural background as .  Cultural " "influences and impact on patient's life structure, values, norms, and healthcare: \"nobody talked about mental health in those days.\" Patient reported that he had a bee sting allergy and so he \"got a shot once a week for about one year.\"  Contextual influences on patient's health include: Family Factors seeks help when needed and Health- Seeking Factors seeks help when needed.    These factors will be addressed in the Preliminary Treatment plan.  Patient identified their preferred language to be English. Patient reported they does not need the assistance of an  or other support involved in therapy.     Patient reported experienced significant delays in developmental tasks, such as attention issues.   Patient's highest education level was some college. Patient reported that he attended approximately three years of college. Patient reported that he did \"fairly well at first\" and then did not go to class and had trouble with concentration. Patient reported that he would \"get a bit behind on the work and then would get freaked out and stop going to the class.\" Patient reported that he was attending the [employer] and then \"was asked to leave, because of grades.\" Patient said  \"I had a hard time making myself do the homework and my grades got increasingly worse and worse.\" He reported that he did not do the homework and \"there would be so much that I would be freaked out and then I would stop going to class.\" He reported that he would then fail the class or drop out.  Patient reported that he attended \"some extension classes at the  and classes at [employer]\" and he would \"fall apart after a while.\" Patient reported feeling \"pressure\" and that he \"could not keep up\" with assignments in college. Patient reported that he went to college on and off for \"probably for 15 years and then gave up for good.\" Patient reported that he often procrastinated with assignments, felt pressure, and then would \"freak out and stop " "going to class.\"    Patient identified the following learning problems: attention and concentration.  Modifications will not be used to assist communication in therapy.  Patient reports they are able to understand written materials. Patient did not receive tutoring services during the school years. Patient did not receive special education services. Patient  reported experiencing trouble with completing assignments in high school, but not in college. Patient reported that in high school, he would do most of his work during class.  Patient did attend post secondary school. Patient reported that he went to a public school and in high school, he \"was able to get by without a lot of work.\"    Patient reported the following relationship history no long term partners.  Patient's current relationship status is single for life. Patient identified their sexual orientation as heterosexual.  Patient reported having o child(eloina). Patient identified his parents and sister as part of their support system.  Patient identified the quality of these relationships as good.       Patient's current living/housing situation involves staying in own home/apartment. Patient lives alone and they report that housing is stable.    Patient is currently employed full time.  Patient reports their finances are obtained through employment. 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  The longest period of employment has been 35 years.  Client has been terminated from a place of employment. Patient reported that he was terminated due to mistakes due to inattention. Patient does not identify finances as a current stressor.        ADHD Related History:   As a child, Patient reported having sleep disturbance, including: daytime drowsiness / fatigue, insomnia and difficulty stopping reading to go to sleep . Patient reported currently experiencing sleep disturbance, including: insomnia, snoring and teeth grinding.  Client reported " "sleeping approximately 6.5-7.5 hours per night.  Patient  reported that he has completed a sleep study. Patient reported that the results were inconclusive; he reported that they said he sleeps \"inefficiently.\"  Patient reported having a well balanced diet, cravings for sweets and sometimes binges on sweets. Patient reported that he does not have concerns for his weight or diabetes. There are not significant nutritional concerns.  Patient reported engaging in regular exercise.    Patient graduated high school in  with approximately a 2.7 GPA.  During the elementary, middle, and high school years, patient recalls academic strengths in the area of math. Patient reported experiencing academic problems in no areas recalled.  Patient reported that he tended to complete his work at school.  Patient said \"I have bad working memory.\" Patient reported that he often forgets items at the grocery store. Patient reported that he recently went to see his doctor and forgot to talk about one of his medical issues.     Patient reported that he been frequently late for work, frequently made mistakes with poor attention to detail, often felt bored, often been late in completing projects, disorganized behavior and distractible behavior .  Patient reported that when he started with his employer in the , his work shift was at 8:00AM. Patient reported that there was not a formal conversation, but due to him routinely showing up later, he usually started around 9:00AM or 9:15AM.     Patient reported that they have not been involved with the legal system. Patient does not being under probation/ parole/ jurisdiction. They are not under any current court jurisdiction. .    Patient has received a 's license.  Patient has received moving violations, includin speeding tickets. Patient reported that when he was \"young\" he would drive \"too fast\" his \"attention would wander\" and then he would get into accidents.   Patient " reported the following driving habits: fails to obey traffic signs and laws, frequently late for appointments, meetings, or work, gets lost easily and often exceeds the speed limit / speeds.  According to client, he is unsure if people are comfortable riding as passengers when he is driving, because he rarely has passengers in his car.     Patient's Strengths and Limitations:  Patient identified the following strengths or resources that will help them succeed in treatment: commitment to health and well being and friends / good social support. Things that may interfere with the patient's success in treatment include: none identified.     Personal and Family Medical History:  Patient does report a family history of mental health concerns.  Patient reports family history is not on file.    Patient reported that his mother has depression and anxiety. Patient reported that he wonders if his mother has ADHD.    Patient does report Mental Health Diagnosis and/or Treatment.  Patient reported the following previous diagnoses which include(s): Depression, Anxiety, Social Anxiety, and Avoidant Personality Disorder. Patient reported symptoms began in elementary school or [employer].   Patient has received mental health services in the past: [facility] with [facility].  Psychiatric Hospitalizations: None.  Patient denies a history of civil commitment.  Patient is not receiving other mental health services.        Patient has had a physical exam to rule out medical causes for current symptoms.  Date of last physical exam was within the past year. Client was encouraged to follow up with PCP if symptoms were to develop. The patient has a [facility] Provider, who is named No Ref-Primary, Physician.     Meng Ko, DO    Family Medicine           Patient reports the following current medical concerns: bladder issue and the following current dental concerns: teeth grinding and his mouth guard is not  comfortable.  Patient denies any issues with pain.  There are not significant appetite / nutritional concerns / weight changes.   Patient does not report a history of head injury / trauma / cognitive impairment.      Oxybutynin Chloride     Medication Adherence:  Patient reports taking.    Patient does not have psychotropic medications.     Patient Allergies:  No Known Allergies    Medical History:  No past medical history on file.      Current Mental Status Exam:   Appearance:  Appropriate    Eye Contact:  Good   Psychomotor:  Normal       Gait / station:  no problem  Attitude / Demeanor: Cooperative   Speech      Rate / Production: Normal/ Responsive      Volume:  Normal  volume      Language:  intact  Mood:   Euthymic  Affect:   Flat    Thought Content: Clear   Thought Process: Coherent       Associations: No loosening of associations  Insight:   Good   Judgment:  Intact   Orientation:  All  Attention/concentration: Easily Distracted    Rating Scales:    PHQ9:    PHQ-9 SCORE 9/7/2021   PHQ-9 Total Score MyChart 7 (Mild depression)   PHQ-9 Total Score 7       GAD7:    SUN-7 SCORE 9/7/2021   Total Score 7 (mild anxiety)   Total Score 7     CGI:     First:Considering your total clinical experience with this particular patient population, how severe are the patient's symptoms at this time?: 3 (9/7/2021  8:18 AM)      Most recent Compared to the patient's condition at the START of treatment, this patient's condition is: 4 (9/7/2021  8:18 AM)      Substance Use:  Patient did not report a family history of substance use concerns; see medical history section for details.  Patient has not received chemical dependency treatment in the past.  Patient has not ever been to detox.      Patient is not currently receiving any chemical dependency treatment.           Substance History of use Age of first use Date of last use     Pattern and duration of use (include amounts and frequency)   Alcohol currently use   20 09/02/21  Patient reported that he tends to drink approximately two alcohol drinks every two weeks.      Cannabis   never used     NA     Amphetamines   never used     NA   Cocaine/crack    never used       NA   Hallucinogens never used         NA   Inhalants never used         NA   Heroin never used         NA   Other Opiates never used     NA   Benzodiazepine   never used     NA   Barbiturates never used     NA   Over the counter meds used in the past 0 01/01/00   Patient reported that he took over the counter medication as prescribed.   Caffeine currently use 17   Patient reported that he consumes approximately 2 cups of coffee and 3 cups of tea each day.    Nicotine  never used     NA   Other substances not listed above:  Identify:  never used     NA     Patient reported the following problems as a result of their substance use: no problems, not applicable.     CAGE- AID:    CAGE-AID Total Score 9/7/2021   Total Score 1   Total Score MyChart 1 (A total score of 2 or greater is considered clinically significant)       Substance Use: Patient reported that he may have headaches or irritability if he stopped drinking caffeine   .  Based on the negative CAGE score and clinical interview there are not indications of drug or alcohol abuse.       Significant Losses / Trauma / Abuse / Neglect Issues:   Patient did not serve in the .  There are indications or report of significant loss, trauma, abuse or neglect issues related to: bullied in [employer].  Concerns for possible neglect are not present.     Safety Assessment:   Current Safety Concerns:  [place] Suicide Severity Rating Scale (Lifetime/Recent)  [place] Suicide Severity Rating (Lifetime/Recent) 9/7/2021   1. Wish to be Dead (Lifetime) No   Has subject engaged in non-suicidal self-injurious behavior? (Lifetime) No     Patient denies current homicidal ideation and behaviors.  Patient denies current self-injurious ideation and behaviors.    Patient denied  "risk behaviors associated with substance use.  Patient reports high risk behaviors associated with mental health symptoms, such as driving \"too fast\"  Patient reports the following current concerns for their personal safety: None.  Patient reports there are not firearms in the house.           History of Safety Concerns:  Patient denied a history of homicidal ideation.     Patient denied a history of personal safety concerns.    Patient denied a history of assaultive behaviors.    Patient denied a history of sexual assault behaviors.     Patient denied a history of risk behaviors associated with substance use.  Patient reported a history of reckless driving associated with mental health symptoms.  Patient reports the following protective factors: effectively controls impulses    Risk Plan:  See Recommendations for Safety and Risk Management Plan    Review of Symptoms per patient report:  Depression: Change in sleep, Lack of interest, Excessive or inappropriate guilt, Change in energy level, Difficulties concentrating and Change in appetite  Myrna:  Irritability  Psychosis: No Symptoms  Anxiety: Excessive worry, Nervousness, Poor concentration and Irritability  Panic:  No symptoms  Post Traumatic Stress Disorder:  No Symptoms   Eating Disorder: No Symptoms  ADD / ADHD:  Inattentive, Difficulties listening, Poor task completion, Poor organizational skills, Distractibility and Forgetful  Conduct Disorder: No symptoms  Autism Spectrum Disorder: Deficits in social communication and social interactions and Deficits in social-emotional reciprocity  Obsessive Compulsive Disorder: No Symptoms    Patient reports the following compulsive behaviors and treatment history: NA     Diagnostic Criteria:     A. Excessive anxiety and worry about a number of events or activities (such as work or school performance).   B. The person finds it difficult to control the worry.  C. Select 3 or more symptoms (required for diagnosis). Only one " item is required in children.   - Being easily fatigued.    - Difficulty concentrating or mind going blank.    - Irritability.   D. The focus of the anxiety and worry is not confined to features of an Axis I disorder.  E. The anxiety, worry, or physical symptoms cause clinically significant distress or impairment in social, occupational, or other important areas of functioning.   F. The disturbance is not due to the direct physiological effects of a substance (e.g., a drug of abuse, a medication) or a general medical condition (e.g., hyperthyroidism) and does not occur exclusively during a Mood Disorder, a Psychotic Disorder, or a Pervasive Developmental Disorder.    - The aformentioned symptoms began 35 year(s) ago and occurs 7 days per week and is experienced as mild.  A. Depressed mood for most of the day, for more days than not, as indicated either by subjective account or observation by others, for at least 2 years. Note: In children and adolescents, mood can be irritable and duration must be at least 1 year.   B. Presence, while depressed, of two (or more) of the following:        - poor appetite or overeating        - low energy or fatigue        - low self-esteem        - poor concentration or difficulty making decisions   C. During the 2-year period (1 year for children or adolescents) of the disturbance, the person has never been without the symptoms in Criteria A and B for more than 2 months at a time.  D. Criteria for a major depressive disorder may be continuously present for 2 years  E. There has never been a Manic Episode, a Mixed Episode, or a Hypomanic Episode, and criteria have never been met for Cyclothymic Disorder.   F. The disturbance is not better explained by a persistent schizoaffective disorder, schizophrenia, delusional disorder, or other specified or unspecified schizophrenia spectrum and other psychotic disorder  G. The symptoms are not attributable to the physiological effects of a  substance (e.g., a drug of abuse, a medication) or another medical condition (e.g., hypothyroidism).   H. The symptoms cause clinically significant distress or impairment in social, occupational, or other important areas of functioning.        - Early Onset: onset is before age 21 years     Functional Status:  Patient reports the following functional impairments: health maintenance, management of the household and or completion of tasks, operation of a motor vehicle, organization, relationship(s), self-care, social interactions and work / vocational responsibilities.         WHODAS:   WHODAS 2.0 Total Score 9/3/2021   Total Score 24   Total Score MyChart 24     Nonprogrammatic care:  Patient is requesting basic services to address current mental health concerns.    Clinical Summary:  1. Reason for assessment: ADHD Assessment  .  2. Psychosocial, Cultural and Contextual Factors: Patient lives alone  .  3. Principal DSM5 Diagnoses  (Sustained by DSM5 Criteria Listed Above):  300.4 (F34.1) Persistent Depressive Disorder, Early onset  300.02 (F41.1) Generalized Anxiety Disorder  4. Other Diagnoses that is relevant to services:   RO ADHD  5. Provisional Diagnosis:  NA  6. Prognosis: Expect Improvement if symptoms are treated.  7. Likely consequences of symptoms if not treated: Symptoms likely to persist and may worsen if not treated.  8. Client strengths include:  employed and has a previous history of therapy .     Recommendations:     1. Plan for Safety and Risk Management:   Recommended that patient call 911 or go to the local ED should there be a change in any of these risk factors..          Report to child / adult protection services was NA.     2. Patient's identified no concerns relevant for the purposes of the ADHD Assessment process.     3. Initial Treatment will focus on:    ADHD Testing:  Patient was given self and collaborative rating scales to be completed prior to the next appointment.  Depression and  anxiety rating scales were completed.  Copies of previous report cards were requested. .     4. Resources/Service Plan:    services are not indicated.   Modifications to assist communication are not indicated.   Additional disability accommodations are not indicated.      5. Collaboration:   Collaboration / coordination of treatment will be initiated with the following  support professionals: primary care physician.      6.  Referrals:   The following referral(s) will be initiated: NA     A Release of Information has been obtained for the following: NA    7. GONSALO:    GONSALO:  Discussed the general effects of drugs and alcohol on health and well-being. Provider gave patient printed information about the effects of chemical use on their health and well being. Recommendations:  Decrease caffeine use.     8. Records:   These were reviewed at time of assessment.   Information in this assessment was obtained from the medical record and  provided by patient who is a good historian.    Patient will have open access to their mental health medical record.      Provider Name/ Credentials:  Gregoria Mota PsyD LP   9/15/2021

## 2021-09-15 NOTE — PROGRESS NOTES
"  [facility]  ADHD Evaluation         Patient: Pablo Ferrari   YOB: 1965  MRN: [id number]  Date(s) of assessment:  Isabel self-report and collateral measures scored and interpreted 10/18/2021         Assessment tools:      Isabel Adult ADHD Rating Scale-IV: Self and Other Reports (BAARS-IV), Isabel Functional Impairment Scale: Self and Other Reports (BFIS) and Isabel Deficits in Executive Functioning Scale: Self and Other Reports (BDEFS)      Assessment Results:        Isabel Adult ADHD Rating Scale-IV: Self and Other Reports (BAARS-IV)  The BAARS-IV assesses for symptoms of ADHD that are experienced in one's daily life. This assessment measure includes self and collateral rating scales designed to provide information regarding current and childhood symptoms of ADHD including inattention, hyperactivity, and impulsivity. Self-report scores are reported as percentiles. Scores at the 76th-83rd percentile are considered marginal, scores at the 84th-92nd percentile are considered borderline, scores at the 93rd-95th percentile are considered mild, scores at the 96th-98th percentile are considered moderate, and those at the 99th percentile are considered severe. Collateral or \"other\" rating scales are reported as number of symptoms observed in comparison to those reported by the client. Norms and percentile scores are not available for collateral reports.     Current Symptoms Scale--Self Report:   Client completed the self-report inventory of current symptoms. The results indicate that the client's Total ADHD Score was 42 which places him in the 97th percentile for overall ADHD symptoms. In addition, the client endorsed 8/9 (99th percentile) Inattention symptoms, 1/9 (86th percentile) Hyperactivity-Impulsivity symptoms, and 7/9 (97th percentile) Sluggish Cognitive Tempo symptoms. Client indicated that the reported symptoms have resulted in impaired functioning in school, home and work. " Overall, the results suggest the client is experiencing Moderate ADHD symptoms.     Current Symptoms Scale--Other Report:  Client's friend/coworker completed the collateral report inventory of current symptoms. Based on the collateral contact's observation of symptoms, the client demonstrates 6/9 Inattention symptoms, 0/5 Hyperactivity, 0/4 Impulsivity symptoms, and 1/9 Sluggish Cognitive Tempo symptoms. The client's Total ADHD Score was 39. The collateral contact indicated the client demonstrates impaired functioning in work and social relationships. The collateral- and self-report scores are significantly different for Sluggish Cognitive Tempo and are within normal limits for all other areas.     Current Symptoms Scale--Other Report:  Client's parents completed the collateral report inventory of current symptoms. Based on the collateral contact's observation of symptoms, the client demonstrates 0/9 Inattention symptoms, 0/5 Hyperactivity, 0/4 Impulsivity symptoms, and 0/9 Sluggish Cognitive Tempo symptoms. The client's Total ADHD Score was 24. The collateral contact indicated the client demonstrates impaired functioning in no areas The collateral- and self-report scores are significantly different for Inattention, Total ADHD, and sluggish cognitive tempo.     Childhood Symptoms Scale--Self-Report:  Client completed the self-report inventory of childhood symptoms. The results indicate that the client's Total ADHD Score was 33 which places him in the 81st percentile for overall ADHD symptoms in childhood. In addition, the client endorsed 5/9 (94th percentile) Inattention symptoms and  0/9 (1st-75th percentile) Hyperactivity-Impulsivity symptoms. Client indicated that the reported symptoms resulted in impaired functioning in school, home and social relationships Overall, the results suggest the client experienced  Borderline to Mild symptoms of ADHD as a child.     Childhood Symptoms Scale--Other Report:  Client's  "parents completed the collateral report inventory of childhood symptoms. Based on the collateral contact's recollection of client's childhood symptoms, the client demonstrated 1/9 Inattention symptoms and 0/9 Hyperactivity-Impulsivity symptoms. The client's Total ADHD Score was 26. The collateral contact indicated the client demonstrates impaired functioning in no areas. The collateral- and self-report scores are significantly different.                           Isabel Functional Impairment Scale: Self and Other Reports (BFIS)  The BFIS is used to assess an individuals' psychosocial impairment in major life/daily activities that may be due to a mental health disorder. This assessment measure includes self and collateral rating scales. Self-report scores are reported as percentiles. Scores at the 76th-83rd percentile are considered marginal, scores at the 84th-92nd percentile are considered borderline, scores at the 93rd-95th percentile are considered mild, scores at the 96th-98th percentile are considered moderate, and those at the 99th percentile are considered severe.Collateral or \"other\" rating scales are reported as number of symptoms observed in comparison to those reported by the client. Norms and percentile scores are not available for collateral reports.     Results indicate the client identified impairment (scores at or greater than 93rd percentile) in the following areas: home-chores, work, social-strangers, driving, daily responsibilities, self-care routines and health maintenance The client's Mean Impairment Score was 6.5 (98th percentile) indicating the client is reporting Moderate impairment in functioning across domains. Client's friend/coworker completed the collateral rating scale, which indicated discrepant results. The collateral contact's scores were generally lower than the client's report. Client's parents completed the collateral rating scale, which indicated discrepant results. The " "collateral contact's scores were generally lower than the client's report.          Isabel Deficits in Executive Functioning Scale (BDEFS)  The BDEFS is a measure used for evaluating dimensions of adult executive functioning in daily life.This assessment measure includes self and collateral rating scales. Self-report scores are reported as percentiles. Scores at the 76th-83rd percentile are considered marginal, scores at the 84th-92nd percentile are considered borderline, scores at the 93rd-95th percentile are considered mild, scores at the 96th-98th percentile are considered moderate, and those at the 99th percentile are considered severe.Collateral or \"other\" rating scales are reported as number of symptoms observed in comparison to those reported by the client. Norms and percentile scores are not available for collateral reports.     Results indicate the client's Total Executive Functioning Score was 240  (99th percentile). The ADHD-Executive Functioning Index score was 30 (99th percentile). These scores suggest the client has Severe deficits in executive functioning. These deficits may be due to ADHD. Results indicate the client identified significant deficits in the following areas: self-management to time 99th , self-organization/problem-solving 97th,  self-restraint 97th, self-motivation 93rd and self-regulation of emotions 97th. Client's friend/coworker completed the collateral rating scale, which indicated similar results. The collateral contact's scores were generally lower than the client's report. Client's parents completed the collateral rating scale, which indicated discrepant results. The collateral contact's scores were generally lower than the client's report.    Next Step: Ask Patient to complete the MMPI.     Gregoria Mota Psy.D, [facility]    10/18/2021       "

## 2021-10-11 ENCOUNTER — HEALTH MAINTENANCE LETTER (OUTPATIENT)
Age: 56
End: 2021-10-11

## 2021-11-02 ENCOUNTER — DOCUMENTATION ONLY (OUTPATIENT)
Dept: PSYCHOLOGY | Facility: CLINIC | Age: 56
End: 2021-11-02
Payer: COMMERCIAL

## 2021-11-02 NOTE — PROGRESS NOTES
Summary of MMPI-2 Results      Patient completed the [place] Multiphasic Personality Inventory-2 (MMPI-2), a self-report personality inventory, as a part of the psychological assessment for ruling out Attention Deficit disorders.  Validity scales indicate that the Patient responded in an open and consistent manner, resulting in a valid profile.  His responses yielded a profile that is consistent with people who report difficulty concentrating, depression, anxiety, socially withdrawn, and somatic symptoms.    Patient responded similarly to people who report depression, suicidal ideation, and feelings of unworthiness and inadequacy. They tend to be introverted, feel guilty, and have a slow personal tempo. Moreover, they tend to lack self- confidence and lacks interests. People like this are often submissive, compliant, and emotionally over- controlled. They often are insecure, pessimistic, and have a low self-esteem. They also tend to experience demoralization that leads to expectations and/or perceptions of failure. People like this often feel uncertain about the future and disinterested in their lives. They feel unsupported by others. They are unhappy, brood, cry easily, and feel hopeless and empty. They may report thoughts of suicide and the wish to be dead. Moreover, they tend to experience low opinions of themselves and do not feel they are liked or are important. They tend to feel unattractive, awkward and clumsy, useless, and like a burden. They also lack self-confidence and find it hard to accept compliments. People like this feel uneasy around others and prefer to be by themselves. They tend to isolate in social situations and view themselves as shy often avoiding social or group situations. People like this are often described as internalizing and introverted. They also tend to lead a careful and cautious lifestyle. Furthermore, they tend to avoid aversive events, are worry-prone, are overly self-critical,  catastrophize, and focus on the negative.    Patient s answers are similar to people who report fear, anxiety, tension, intruding thoughts, difficulty concentrating, and obsessions and compulsions.     People who responded similarly to the patient, report somatic symptoms and chronic pain. Moreover, they tend to lack insight concerning causes of the symptoms. They also often have sleep difficulties.    Patient s answers are similar to people who engage in anti-social behaviors and experience trouble with the law. People like this often have difficulty adjusting to life after high school.  Moreover, they tend to have poor concentration, obsessiveness, and indecisiveness. They feel a lack of family support for their career, question their choice of career, and have negative attitudes toward coworkers. Furthermore, they often experience negative attitudes towards doctors and mental health treatment. People like this often feel a dissatisfaction with their romantic relationship.     Patient responded similarly to people who report feeling confused and experience disorganized thinking. People like this report experiencing hallucinations and/or delusions, and they have impaired contact with reality. Patient responded similarly to people who have brief acute psychotic episodes and are tense, nervous, fearful, depressed, despondent, hopeless, and suicidal. They tend to have blunted or inappropriate affect. Moreover, they tend to experience problems concentrating and attending. People like this are often isolated, shy, withdrawn, introverted, and lack basic social skills. They tend to feel inadequate and inferior, and set high standards for themself and feel guilty when the standards are not met. People like this tend to be interested in obscure subjects.      Results of testing were consistent with his report upon direct interview.     Next step: Schedule ADHD feedback appointment.     Gregoria Mota PsyD LP 11/2/2021

## 2021-11-08 ENCOUNTER — FCC EXTENDED DOCUMENTATION (OUTPATIENT)
Dept: PSYCHOLOGY | Facility: CLINIC | Age: 56
End: 2021-11-08
Payer: COMMERCIAL

## 2021-11-08 NOTE — PROGRESS NOTES
[facility]  ADHD Evaluation         Patient: Pablo Ferrari   YOB: 1965  MRN: [id number]  Date(s) of assessment:  Diagnostic Assessment 9/15/2021, Isabel self-report and collateral measures scored and interpreted 10/18/2021 and MMPI 11/05/2021    Information about appointment:  Client attended two  sessions to aid in determining client's mental health diagnosis or diagnoses and treatment recommendations that best address client concerns. Client records including medical were reviewed. A diagnostic assessment was conducted at the initial appointment. Client completed several rating scales to assist in assessing attention-related and other mental health symptoms that may be causing impairments in functioning. Rating scales were also completed by a collateral contact.    Assessment tools:      Isabel Adult ADHD Rating Scale-IV: Self and Other Reports (BAARS-IV), Isabel Functional Impairment Scale: Self and Other Reports (BFIS), Isabel Deficits in Executive Functioning Scale: Self and Other Reports (BDEFS), Patient Health Questionnaire-9 (PHQ-9), Generalized Anxiety Disorder-7 (SUN-7) and [place] Multiphasic Personality Inventory (MMPI)    ADHD Assessment tools have been completed remotely as in person observations were not possible.    Assessment Results:    Behavioral Observations:  The Patient arrived early for all appointments and scheduled follow-up appointments efficiently. The Patient appeared motivated to complete the assessment and was polite in interactions. He was oriented by three. He appeared to experience difficulty with attention during sessions. The following results are likely to be an accurate reflection of Patient's current functioning.      Isabel Adult ADHD Rating Scale-IV: Self and Other Reports (BAARS-IV)  The BAARS-IV assesses for symptoms of ADHD that are experienced in one's daily life. This assessment measure includes self and collateral rating scales  "designed to provide information regarding current and childhood symptoms of ADHD including inattention, hyperactivity, and impulsivity. Self-report scores are reported as percentiles. Scores at the 76th-83rd percentile are considered marginal, scores at the 84th-92nd percentile are considered borderline, scores at the 93rd-95th percentile are considered mild, scores at the 96th-98th percentile are considered moderate, and those at the 99th percentile are considered severe. Collateral or \"other\" rating scales are reported as number of symptoms observed in comparison to those reported by the client. Norms and percentile scores are not available for collateral reports.      Current Symptoms Scale--Self Report:   Client completed the self-report inventory of current symptoms. The results indicate that the client's Total ADHD Score was 42 which places him in the 97th percentile for overall ADHD symptoms. In addition, the client endorsed 8/9 (99th percentile) Inattention symptoms, 1/9 (86th percentile) Hyperactivity-Impulsivity symptoms, and 7/9 (97th percentile) Sluggish Cognitive Tempo symptoms. Client indicated that the reported symptoms have resulted in impaired functioning in school, home and work. Overall, the results suggest the client is experiencing Moderate ADHD symptoms.      Current Symptoms Scale--Other Report:  Client's friend/coworker completed the collateral report inventory of current symptoms. Based on the collateral contact's observation of symptoms, the client demonstrates 6/9 Inattention symptoms, 0/5 Hyperactivity, 0/4 Impulsivity symptoms, and 1/9 Sluggish Cognitive Tempo symptoms. The client's Total ADHD Score was 39. The collateral contact indicated the client demonstrates impaired functioning in work and social relationships. The collateral- and self-report scores are significantly different for Sluggish Cognitive Tempo and are within normal limits for all other areas.      Current Symptoms " Scale--Other Report:  Client's parents completed the collateral report inventory of current symptoms. Based on the collateral contact's observation of symptoms, the client demonstrates 0/9 Inattention symptoms, 0/5 Hyperactivity, 0/4 Impulsivity symptoms, and 0/9 Sluggish Cognitive Tempo symptoms. The client's Total ADHD Score was 24. The collateral contact indicated the client demonstrates impaired functioning in no areas The collateral- and self-report scores are significantly different for Inattention, Total ADHD, and sluggish cognitive tempo.      Childhood Symptoms Scale--Self-Report:  Client completed the self-report inventory of childhood symptoms. The results indicate that the client's Total ADHD Score was 33 which places him in the 81st percentile for overall ADHD symptoms in childhood. In addition, the client endorsed 5/9 (94th percentile) Inattention symptoms and  0/9 (1st-75th percentile) Hyperactivity-Impulsivity symptoms. Client indicated that the reported symptoms resulted in impaired functioning in school, home and social relationships Overall, the results suggest the client experienced  Borderline to Mild symptoms of ADHD as a child.      Childhood Symptoms Scale--Other Report:  Client's parents completed the collateral report inventory of childhood symptoms. Based on the collateral contact's recollection of client's childhood symptoms, the client demonstrated 1/9 Inattention symptoms and 0/9 Hyperactivity-Impulsivity symptoms. The client's Total ADHD Score was 26. The collateral contact indicated the client demonstrates impaired functioning in no areas. The collateral- and self-report scores are significantly different.                           Isabel Functional Impairment Scale: Self and Other Reports (BFIS)  The BFIS is used to assess an individuals' psychosocial impairment in major life/daily activities that may be due to a mental health disorder. This assessment measure includes self and  "collateral rating scales. Self-report scores are reported as percentiles. Scores at the 76th-83rd percentile are considered marginal, scores at the 84th-92nd percentile are considered borderline, scores at the 93rd-95th percentile are considered mild, scores at the 96th-98th percentile are considered moderate, and those at the 99th percentile are considered severe.Collateral or \"other\" rating scales are reported as number of symptoms observed in comparison to those reported by the client. Norms and percentile scores are not available for collateral reports.      Results indicate the client identified impairment (scores at or greater than 93rd percentile) in the following areas: home-chores, work, social-strangers, driving, daily responsibilities, self-care routines and health maintenance. The client's Mean Impairment Score was 6.5 (98th percentile) indicating the client is reporting Moderate impairment in functioning across domains. Client's friend/coworker completed the collateral rating scale, which indicated discrepant results. The collateral contact's scores were generally lower than the client's report. Client's parents completed the collateral rating scale, which indicated discrepant results. The collateral contact's scores were generally lower than the client's report.           Isabel Deficits in Executive Functioning Scale (BDEFS)  The BDEFS is a measure used for evaluating dimensions of adult executive functioning in daily life.This assessment measure includes self and collateral rating scales. Self-report scores are reported as percentiles. Scores at the 76th-83rd percentile are considered marginal, scores at the 84th-92nd percentile are considered borderline, scores at the 93rd-95th percentile are considered mild, scores at the 96th-98th percentile are considered moderate, and those at the 99th percentile are considered severe.Collateral or \"other\" rating scales are reported as number of symptoms " observed in comparison to those reported by the client. Norms and percentile scores are not available for collateral reports.      Results indicate the client's Total Executive Functioning Score was 240  (99th percentile). The ADHD-Executive Functioning Index score was 30 (99th percentile). These scores suggest the client has Severe deficits in executive functioning. These deficits may be due to ADHD. Results indicate the client identified significant deficits in the following areas: self-management to time 99th , self-organization/problem-solving 97th,  self-restraint 97th, self-motivation 93rd and self-regulation of emotions 97th. Client's friend/coworker completed the collateral rating scale, which indicated similar results. The collateral contact's scores were generally lower than the client's report. Client's parents completed the collateral rating scale, which indicated discrepant results. The collateral contact's scores were generally lower than the client's report.       Summary of MMPI-2 Results      Patient completed the [place] Multiphasic Personality Inventory-2 (MMPI-2), a self-report personality inventory, as a part of the psychological assessment for ruling out Attention Deficit disorders.  Validity scales indicate that the Patient responded in an open and consistent manner, resulting in a valid profile.  His responses yielded a profile that is consistent with people who report difficulty concentrating, depression, anxiety, socially withdrawn, and somatic symptoms.     Patient responded similarly to people who report depression, suicidal ideation, and feelings of unworthiness and inadequacy. They tend to be introverted, feel guilty, and have a slow personal tempo. Moreover, they tend to lack self- confidence and lacks interests. People like this are often submissive, compliant, and emotionally over- controlled. They often are insecure, pessimistic, and have a low self-esteem. They also tend to  experience demoralization that leads to expectations and/or perceptions of failure. People like this often feel uncertain about the future and disinterested in their lives. They feel unsupported by others. They are unhappy, brood, cry easily, and feel hopeless and empty. They may report thoughts of suicide and the wish to be dead. Moreover, they tend to experience low opinions of themselves and do not feel they are liked or are important. They tend to feel unattractive, awkward and clumsy, useless, and like a burden. They also lack self-confidence and find it hard to accept compliments. People like this feel uneasy around others and prefer to be by themselves. They tend to isolate in social situations and view themselves as shy often avoiding social or group situations. People like this are often described as internalizing and introverted. They also tend to lead a careful and cautious lifestyle. Furthermore, they tend to avoid aversive events, are worry-prone, are overly self-critical, catastrophize, and focus on the negative.     Patient s answers are similar to people who report fear, anxiety, tension, intruding thoughts, difficulty concentrating, and obsessions and compulsions.      People who responded similarly to the patient, report somatic symptoms and chronic pain. Moreover, they tend to lack insight concerning causes of the symptoms. They also often have sleep difficulties.     Patient s answers are similar to people who engage in anti-social behaviors and experience trouble with the law. People like this often have difficulty adjusting to life after high school.  Moreover, they tend to have poor concentration, obsessiveness, and indecisiveness. They feel a lack of family support for their career, question their choice of career, and have negative attitudes toward coworkers. Furthermore, they often experience negative attitudes towards doctors and mental health treatment. People like this often feel a  dissatisfaction with their romantic relationship.      Patient responded similarly to people who report feeling confused and experience disorganized thinking. People like this report experiencing hallucinations and/or delusions, and they have impaired contact with reality. Patient responded similarly to people who have brief acute psychotic episodes and are tense, nervous, fearful, depressed, despondent, hopeless, and suicidal. They tend to have blunted or inappropriate affect. Moreover, they tend to experience problems concentrating and attending. People like this are often isolated, shy, withdrawn, introverted, and lack basic social skills. They tend to feel inadequate and inferior, and set high standards for themself and feel guilty when the standards are not met. People like this tend to be interested in obscure subjects.        Results of testing were consistent with his report upon direct interview.         Generalized Anxiety Disorder Questionnaire (SUN-7)  This questionnaire is designed to assess for anxiety in adults.  Based on the score, he is experiencing mild symptoms of anxiety. Client identified the following symptoms of anxiety: feeling on edge/nervous/anxious, difficulty controlling worry, worrying about many different things, trouble relaxing, becoming easily annoyed or irritable and feeling something awful might happen.    Patient Health Questionnaire- 9 (PHQ-9)   This questionnaire is designed to assess for depression in adults.  Based on the score, he is experiencing mild symptoms of depression. Client identified the following symptoms of depression: depressed mood, lack of interest, difficulty with sleep, poor appetite or overeating, feeling bad about self, poor concentration and restlessness or lethargy.           Summary (based on clinical interview, review of records, test results):    Identifying Information:  Patient is a 55 year old,.  The pronoun use throughout this assessment  "reflects the patient's chosen pronoun.  Patient was referred for an assessment by self.  Patient attended the session alone. Patient's current PCP is    Meng Ko DO  Family Medicine            Chief Complaint:   The purpose of this evaluation is to: provide treatment recommendations and clarify diagnosis. Patient reported seeking services at this time for diagnostic assessment and recommendations for treatment.  Patient reported that he has not completed a previous ADHD diagnostic assessment.  Patient has received a previous diagnosis of Depression, Anxiety, Social Anxiety, and Avoidant Personality Disorder. Patient reported that he has never taken psychotropic medication. Patient reported that he saw a therapist in college and he saw a therapist in 2018 for approximately one year through [employer].     Patient reported the following symptoms: difficulty with task completion, motivation to begin tasks, forgetful, and easily distracted. Patient reported that his symptoms began as early as [employer] and worsened in college.         Social/Family History:  Patient reported they grew up in [facility] [place].  They were raised by biological parents  .  Parents were always together.  Patient reported that their childhood was \"fine.\" Patient reported that he was \"bullied a lot\" at school and his parents did not address issues at home. Patient reported that his father disapproved \"of fighting\" so the patient was \"viewed as an easy target.\"  Patient described their current relationships with family of origin as close with his parents. Patient reported that he is the oldest of two children. Patient reported that he speaks with his sister regularly.      Patient described his childhood family environment as nurturing and stable.  As a child, patient reported that he failed to complete assigned chores in the home environment, had problems getting ready for school in the morning, had problems with " "organization and keeping track of items, misplaced or lost things and had problems managing temper with frequent emotional outbursts. Patient reported difficulty with childhood peer relationships. Patient reported that as a child, he would often become distracted with trying to get ready for bed.      The patient describes their cultural background as .  Cultural influences and impact on patient's life structure, values, norms, and healthcare: \"nobody talked about mental health in those days.\" Patient reported that he had a bee sting allergy and so he \"got a shot once a week for about one year.\"  Contextual influences on patient's health include: Family Factors seeks help when needed and Health- Seeking Factors seeks help when needed.    These factors will be addressed in the Preliminary Treatment plan.  Patient identified their preferred language to be English. Patient reported they does not need the assistance of an  or other support involved in therapy.      Patient reported experienced significant delays in developmental tasks, such as attention issues.   Patient's highest education level was some college. Patient reported that he attended approximately three years of college. Patient reported that he did \"fairly well at first\" and then did not go to class and had trouble with concentration. Patient reported that he would \"get a bit behind on the work and then would get freaked out and stop going to the class.\" Patient reported that he was attending the  of  and then \"was asked to leave, because of grades.\" Patient said  \"I had a hard time making myself do the homework and my grades got increasingly worse and worse.\" He reported that he did not do the homework and \"there would be so much that I would be freaked out and then I would stop going to class.\" He reported that he would then fail the class or drop out.  Patient reported that he attended \"some extension classes at the [employer] and classes " "at [employer]\" and he would \"fall apart after a while.\" Patient reported feeling \"pressure\" and that he \"could not keep up\" with assignments in college. Patient reported that he went to college on and off for \"probably for 15 years and then gave up for good.\" Patient reported that he often procrastinated with assignments, felt pressure, and then would \"freak out and stop going to class.\"     Patient identified the following learning problems: attention and concentration.  Modifications will not be used to assist communication in therapy.  Patient reports they are able to understand written materials. Patient did not receive tutoring services during the school years. Patient did not receive special education services. Patient  reported experiencing trouble with completing assignments in high school, but not in college. Patient reported that in high school, he would do most of his work during class.  Patient did attend post secondary school. Patient reported that he went to a public school and in high school, he \"was able to get by without a lot of work.\"     Patient reported the following relationship history no long term partners.  Patient's current relationship status is single for life. Patient identified their sexual orientation as heterosexual.  Patient reported having o child(eloina). Patient identified his parents and sister as part of their support system.  Patient identified the quality of these relationships as good.        Patient's current living/housing situation involves staying in own home/apartment. Patient lives alone and they report that housing is stable.     Patient is currently employed full time.  Patient reports their finances are obtained through employment. 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  The longest period of employment has been 35 years.  Client has been terminated from a place of employment. Patient reported that he was terminated due to mistakes due to " "inattention. Patient does not identify finances as a current stressor.          ADHD Related History:   As a child, Patient reported having sleep disturbance, including: daytime drowsiness / fatigue, insomnia and difficulty stopping reading to go to sleep . Patient reported currently experiencing sleep disturbance, including: insomnia, snoring and teeth grinding.  Client reported sleeping approximately 6.5-7.5 hours per night.  Patient  reported that he has completed a sleep study. Patient reported that the results were inconclusive; he reported that they said he sleeps \"inefficiently.\"  Patient reported having a well balanced diet, cravings for sweets and sometimes binges on sweets. Patient reported that he does not have concerns for his weight or diabetes. There are not significant nutritional concerns.  Patient reported engaging in regular exercise.     Patient graduated high school in 1983 with approximately a 2.7 GPA.  During the elementary, middle, and high school years, patient recalls academic strengths in the area of math. Patient reported experiencing academic problems in no areas recalled.  Patient reported that he tended to complete his work at school.  Patient said \"I have bad working memory.\" Patient reported that he often forgets items at the grocery store. Patient reported that he recently went to see his doctor and forgot to talk about one of his medical issues.      Patient reported that he been frequently late for work, frequently made mistakes with poor attention to detail, often felt bored, often been late in completing projects, disorganized behavior and distractible behavior .  Patient reported that when he started with his employer in the 1980s, his work shift was at 8:00AM. Patient reported that there was not a formal conversation, but due to him routinely showing up later, he usually started around 9:00AM or 9:15AM.      Patient reported that they have not been involved with the legal " "system. Patient does not being under probation/ parole/ jurisdiction. They are not under any current court jurisdiction. .     Patient has received a 's license.  Patient has received moving violations, includin speeding tickets. Patient reported that when he was \"young\" he would drive \"too fast\" his \"attention would wander\" and then he would get into accidents.   Patient reported the following driving habits: fails to obey traffic signs and laws, frequently late for appointments, meetings, or work, gets lost easily and often exceeds the speed limit / speeds.  According to client, he is unsure if people are comfortable riding as passengers when he is driving, because he rarely has passengers in his car.      Patient's Strengths and Limitations:  Patient identified the following strengths or resources that will help them succeed in treatment: commitment to health and well being and friends / good social support. Things that may interfere with the patient's success in treatment include: none identified.      Personal and Family Medical History:  Patient does report a family history of mental health concerns.  Patient reports family history is not on file.     Patient reported that his mother has depression and anxiety. Patient reported that he wonders if his mother has ADHD.     Patient does report Mental Health Diagnosis and/or Treatment.  Patient reported the following previous diagnoses which include(s): Depression, Anxiety, Social Anxiety, and Avoidant Personality Disorder. Patient reported symptoms began in elementary school or [employer].   Patient has received mental health services in the past: therapy with therapist at [facility].  Psychiatric Hospitalizations: None.  Patient denies a history of civil commitment.  Patient is not receiving other mental health services.        Patient has had a physical exam to rule out medical causes for current symptoms.  Date of last physical exam was within " the past year. Client was encouraged to follow up with PCP if symptoms were to develop. The patient has a [facility] Provider, who is named:     Meng Ko DO     Family Medicine              Patient reports the following current medical concerns: bladder issue and the following current dental concerns: teeth grinding and his mouth guard is not comfortable.  Patient denies any issues with pain.  There are not significant appetite / nutritional concerns / weight changes.   Patient does not report a history of head injury / trauma / cognitive impairment.       Oxybutynin Chloride      Medication Adherence:  Patient reports taking.     Patient does not have psychotropic medications.      Patient Allergies:  No Known Allergies     Medical History:  No past medical history on file.           Rating Scales:     PHQ 9/7/2021 9/13/2021   PHQ-9 Total Score 7 9   Q9: Thoughts of better off dead/self-harm past 2 weeks Not at all Not at all       SUN-7 SCORE 9/7/2021 9/13/2021   Total Score 7 (mild anxiety) 7 (mild anxiety)   Total Score 7 7        Substance Use:  Patient did not report a family history of substance use concerns; see medical history section for details.  Patient has not received chemical dependency treatment in the past.  Patient has not ever been to detox.       Patient is not currently receiving any chemical dependency treatment.               Substance History of use Age of first use Date of last use       Pattern and duration of use (include amounts and frequency)   Alcohol currently use    20 09/02/21 Patient reported that he tends to drink approximately two alcohol drinks every two weeks.       Cannabis    never used     NA      Amphetamines    never used     NA   Cocaine/crack     never used       NA   Hallucinogens never used         NA   Inhalants never used         NA   Heroin never used         NA   Other Opiates never used     NA   Benzodiazepine    never used     NA   Barbiturates  "never used     NA   Over the counter meds used in the past 0 01/01/00    Patient reported that he took over the counter medication as prescribed.   Caffeine currently use 17   Patient reported that he consumes approximately 2 cups of coffee and 3 cups of tea each day.    Nicotine  never used     NA   Other substances not listed above:  Identify:  never used     NA      Patient reported the following problems as a result of their substance use: no problems, not applicable.     CAGE- AID:    CAGE-AID Total Score 9/7/2021   Total Score 1   Total Score MyChart 1 (A total score of 2 or greater is considered clinically significant)         Substance Use: Patient reported that he may have headaches or irritability if he stopped drinking caffeine   .  Based on the negative CAGE score and clinical interview there are not indications of drug or alcohol abuse.         Significant Losses / Trauma / Abuse / Neglect Issues:   Patient did not serve in the .  There are indications or report of significant loss, trauma, abuse or neglect issues related to: bullied in [employer].  Concerns for possible neglect are not present.      Safety Assessment:   Current Safety Concerns:  [place] Suicide Severity Rating Scale (Lifetime/Recent)  [place] Suicide Severity Rating (Lifetime/Recent) 9/7/2021   1. Wish to be Dead (Lifetime) No   Has subject engaged in non-suicidal self-injurious behavior? (Lifetime) No      Patient denies current homicidal ideation and behaviors.  Patient denies current self-injurious ideation and behaviors.    Patient denied risk behaviors associated with substance use.  Patient reports high risk behaviors associated with mental health symptoms, such as driving \"too fast\"  Patient reports the following current concerns for their personal safety: None.  Patient reports there are not firearms in the house.            History of Safety Concerns:  Patient denied a history of homicidal ideation.   "   Patient denied a history of personal safety concerns.    Patient denied a history of assaultive behaviors.    Patient denied a history of sexual assault behaviors.     Patient denied a history of risk behaviors associated with substance use.  Patient reported a history of reckless driving associated with mental health symptoms.  Patient reports the following protective factors: effectively controls impulses     Risk Plan:  See Recommendations for Safety and Risk Management Plan     Review of Symptoms per patient report:  Depression:     Change in sleep, Lack of interest, Excessive or inappropriate guilt, Change in energy level, Difficulties concentrating and Change in appetite  Myrna:             Irritability  Psychosis:       No Symptoms  Anxiety:           Excessive worry, Nervousness, Poor concentration and Irritability  Panic:              No symptoms  Post Traumatic Stress Disorder:  No Symptoms   Eating Disorder:          No Symptoms  ADD / ADHD:              Inattentive, Difficulties listening, Poor task completion, Poor organizational skills, Distractibility and Forgetful  Conduct Disorder:       No symptoms  Autism Spectrum Disorder:     Deficits in social communication and social interactions and Deficits in social-emotional reciprocity  Obsessive Compulsive Disorder:       No Symptoms     Patient reports the following compulsive behaviors and treatment history: NA      Diagnostic Criteria:      A. Excessive anxiety and worry about a number of events or activities (such as work or school performance).   B. The person finds it difficult to control the worry.  C. Select 3 or more symptoms (required for diagnosis). Only one item is required in children.   - Being easily fatigued.    - Difficulty concentrating or mind going blank.    - Irritability.   D. The focus of the anxiety and worry is not confined to features of an Axis I disorder.  E. The anxiety, worry, or physical symptoms cause clinically  significant distress or impairment in social, occupational, or other important areas of functioning.   F. The disturbance is not due to the direct physiological effects of a substance (e.g., a drug of abuse, a medication) or a general medical condition (e.g., hyperthyroidism) and does not occur exclusively during a Mood Disorder, a Psychotic Disorder, or a Pervasive Developmental Disorder.    - The aformentioned symptoms began 35 year(s) ago and occurs 7 days per week and is experienced as mild.  A. Depressed mood for most of the day, for more days than not, as indicated either by subjective account or observation by others, for at least 2 years. Note: In children and adolescents, mood can be irritable and duration must be at least 1 year.   B. Presence, while depressed, of two (or more) of the following:        - poor appetite or overeating        - low energy or fatigue        - low self-esteem        - poor concentration or difficulty making decisions   C. During the 2-year period (1 year for children or adolescents) of the disturbance, the person has never been without the symptoms in Criteria A and B for more than 2 months at a time.  D. Criteria for a major depressive disorder may be continuously present for 2 years  E. There has never been a Manic Episode, a Mixed Episode, or a Hypomanic Episode, and criteria have never been met for Cyclothymic Disorder.   F. The disturbance is not better explained by a persistent schizoaffective disorder, schizophrenia, delusional disorder, or other specified or unspecified schizophrenia spectrum and other psychotic disorder  G. The symptoms are not attributable to the physiological effects of a substance (e.g., a drug of abuse, a medication) or another medical condition (e.g., hypothyroidism).   H. The symptoms cause clinically significant distress or impairment in social, occupational, or other important areas of functioning.        - Early Onset: onset is before age 21  years          Attention Deficit Hyperactivity-Impulsivity Disorder Diagnostic Criteria:     Inattention: Six or more symptoms of inattention for children up to age 16, or five or more for adolescents 17 and older and adults; symptoms of inattention have been present for at least 6 months, and they are inappropriate for developmental level:    -Often fails to give close attention to details or makes careless mistakes in schoolwork, at work, or with other activities. YES  -Often has trouble holding attention on tasks or play activities. YES  -Often does not seem to listen when spoken to directly. YES  -Often does not follow through on instructions and fails to finish schoolwork, chores, or duties in the workplace (e.g., loses focus, side-tracked). YES  -Often has trouble organizing tasks and activities. YES  -Often avoids, dislikes, or is reluctant to do tasks that require mental effort over a long period of time (such as schoolwork or homework).  -Often loses things necessary for tasks and activities (e.g. school materials, pencils, books, tools, wallets, keys, paperwork, eyeglasses, mobile telephones).  -Is often easily distracted YES  -Is often forgetful in daily activities. YES    Hyperactivity and Impulsivity: Six or more symptoms of hyperactivity-impulsivity for children up to age 16, or five or more for adolescents 17 and older and adults; symptoms of hyperactivity-impulsivity have been present for at least 6 months to an extent that is disruptive and inappropriate for the person s developmental level:    -Often fidgets with or taps hands or feet, or squirms in seat.  -Often leaves seat in situations when remaining seated is expected.  -Often runs about or climbs in situations where it is not appropriate (adolescents or adults may be limited to feeling restless).  -Often unable to play or take part in leisure activities quietly.  -Is often  on the go  acting as if  driven by a motor .  -Often talks  excessively.  YES  -Often blurts out an answer before a question has been completed.  -Often has trouble waiting his/her turn.  -Often interrupts or intrudes on others (e.g., butts into conversations or games)    In addition, the following conditions must be met:    -Several inattentive or hyperactive-impulsive symptoms were present before age 12 years.  YES  -Several symptoms are present in two or more setting, (e.g., at home, school or work; with friends or relatives; in other activities).  YES  -There is clear evidence that the symptoms interfere with, or reduce the quality of, social, school, or work functioning.  YES  -The symptoms do not happen only during the course of schizophrenia or another psychotic disorder. The symptoms are not better explained by another mental disorder (e.g. Mood Disorder, Anxiety Disorder, Dissociative Disorder, or a Personality Disorder).  YES        Predominantly Inattentive Presentation: if enough symptoms of inattention, but not hyperactivity-impulsivity, were present for the past six months  YES        Functional Status:  Patient reports the following functional impairments: health maintenance, management of the household and or completion of tasks, operation of a motor vehicle, organization, relationship(s), self-care, social interactions and work / vocational responsibilities.           WHODAS:   WHODAS 2.0 Total Score 9/3/2021   Total Score 24   Total Score MyChart 24        In conclusion, results of testing were significant for inattention, depression, and anxiety. Rating scales suggested that the client is currently experiencing symptoms of an inattention that have been present to some extent since childhood. The client endorsed moderate overall symptoms of inattention and hyperactivity-impulsivity. The client endorsed inattention more often than he did hyperactivity-impulsivity. The client's parents did not endorse symptoms for his childhood or current abilities. However,  the client's co-worker reported inattention for his current abilities.  The client reported scores of moderate functional impairment. This means that the client likely  Has difficulty functioning with his current abilities. He significantly struggles with home-chores, work, social-strangers, driving, daily responsibilities, self-care routines and health maintenance. Additionally, he reported scores of severe executive functioning difficulties. He reported significant deficits for self-management to time, self-organization/problem-solving,  self-restraint, self-motivation, and self-regulation of emotions. Personality testing was suggestive of the client experiencing difficulty with concentration, depression, anxiety, social withdrawal, and somatic symptoms. Additionally, the client is experiencing symptoms of anxiety and depression, which were reported on questionnaires.    Patient meets diagnostic criteria for ADHD as evidenced above per questionnaires and clinical interview of social history.Patient's collateral  for his current functioning indicated mostly similar symptoms. Patient's parents reported significantly fewer symptoms that the patient did for both his current level of functioning and for his childhood.     Patient no showed his ADHD Assessment feedback appointment on 12/8/2021 and did not reschedule when  [employer] employees attempted to reach out to him to reschedule the appointment. Hence, he is not aware of final diagnoses and the feedback has not been completed. Billing for services completed thus far.          DSM5 Diagnoses: (Sustained by DSM5 Criteria Listed Above)  Diagnoses: Attention-Deficit/Hyperactivity Disorder  314.00 (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   Rule Out Schizoid Personality Disorder, Schizotypal Personality Disorder, Avoidant Personality Disorder,  Social Anxiety Disorder, Insomnia    Psychosocial & Contextual Factors: Work Related Stress      Recommendations:     1. Schedule an appointment with your physician to discuss a medication evaluation.  2. Access resources through websites, books, and articles such as those provided in the handout.  3. Consider working with an ADHD  or individual therapist to learn skills to  assist with symptom management, as well as ways to improve relationships,  etc that may have been impacted by your symptoms.  4. Consider attending workshops or support groups through [employer]  5. Individual therapy is recommended for the treatment of anxiety and depression. Therapies focusing on identifying and challenging problematic thought processes can be beneficial.   6. Schedule a follow-up appointment with me in about six weeks to review symptoms, treatment involvement, and struggles and/or successes. A follow up appointment would be recommended following a feedback session.       Gregoria Mota Psy.D, LP    1/21/2022     Psychological Testing   Billing/Services Summary       Testing Evaluation Services Base: 75197  (1st 60 mins) Add-on: 60653  (each addtl 60 mins)   Record Review and Clarify Referral Question   9/3/2021 3:25-3:30PM 5 minutes   Integration/Report Generation   10/18/21 6:30-7:30PM [facility] (60)  11/2/21 2:10-3:05PM MMP (55)  11/8/21 11:00-11:45AM, 1/21/2022 7:45-8:00AM Integrated Report (60) 175 minutes   Interactive Feedback Session  NA 0 minutes   Post-Service Work   NA 0 minutes   Total Time: 180 minutes (3 hours, 0 minutes)   Total Units: 1 2           Diagnosis(es): (ICD-10) Attention-Deficit/Hyperactivity Disorder  314.00 (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

## 2022-01-03 ENCOUNTER — E-VISIT (OUTPATIENT)
Dept: URGENT CARE | Facility: CLINIC | Age: 57
End: 2022-01-03
Payer: COMMERCIAL

## 2022-01-03 DIAGNOSIS — Z20.822 SUSPECTED COVID-19 VIRUS INFECTION: Primary | ICD-10-CM

## 2022-01-03 PROCEDURE — 99421 OL DIG E/M SVC 5-10 MIN: CPT | Performed by: PHYSICIAN ASSISTANT

## 2022-01-03 NOTE — PATIENT INSTRUCTIONS
Pablo,      Based on your responses, you may have coronavirus (COVID-19). This illness can cause fever, cough and trouble breathing. Many people get a mild case and get better on their own. Some people can get very sick.    Will I be tested for COVID-19?  We would like to test you for COVID-19 virus. I have placed orders for this test.     To schedule: go to your [facility] home page and scroll down to the section that says  You have an appointment that needs to be scheduled  and click the large green button that says  Schedule Now  and follow the steps to find the next available openings.    If you are unable to complete these [facility] scheduling steps, please call [phone] to schedule your testing.     Return to work/school/ guidance:  Please let your workplace manager and staffing office know when your isolation ends.       If you receive a positive COVID-19 test result, follow the guidance of the those who are giving you the results. Usually the return to work is 10 days from symptom onset or positive test date, (or in some cases 20 days if you are immunocompromised). If your symptoms started after your positive test, the 10 days should start when your symptoms started.   o If you work at [employer], you must also be cleared by Employee Occupational Health and Safety to return to work.      If you receive a negative COVID-19 test result and did not have a high risk exposure to someone with a known positive COVID-19 test, you can return to work once you're free of fever for 24 hours without fever-reducing medication and your symptoms are improving or resolved.    If you receive a negative COVID-19 test and had a high-risk exposure to someone who has tested positive for COVID-19 then you can return to work 14 days after your last contact with the positive individual. Follow quarantine guidance given by your doctor or public health officials.     Sign up for [employer] Loop:  We know it's scary to hear  that you might have COVID-19. We want to track your symptoms to make sure you're okay over the next 2 weeks. Please look for an email from [employer]--this is a free, online program that we'll use to keep in touch. To sign up, follow the link in the email you will receive. Learn more at [email or web address]    How can I take care of myself?  Over the counter medications may help with your symptoms like congestion, cough, chills, or fever.    There are not many effective prescription treatments for early COVID-19. Hydroxychloroquine, ivermectin, and azithromycin are not effective or recommended for COVID-19.    If your symptoms started in the last 10 days, you may be able to receive a treatment with monoclonal antibodies. This treatment can lower your risk of severe illness and going to the hospital. It is given through an IV or under your skin (subcutaneous) and must be given at an infusion center. You must be 12 or older, weight at least 88 pounds, and have a positive COVID-19 test.     If you would like to sign up to be considered to receive the monoclonal antibody medicine, please complete a participation form through the [facility] of [facility] here: MNRAP ([email or web address]). You may also call the [facility] COVID-19 Public Hotline at 1-613.218.8987 (open Mon-Fri: 9am-7pm and Sat: 10am-6pm).     Not all people who are eligible will receive the medicine, since supply is limited. You will be contacted in the next 1 to 2 business days only if you are selected. If you do not receive a call, you have not been selected to receive the medicine. If you have any questions about this medication, please contact your primary care provider. For more information, see [email or web address]      Get lots of rest. Drink extra fluids (unless a doctor has told you not to)    Take Tylenol (acetaminophen) or ibuprofen for fever or  pain. If you have liver or kidney problems, ask your family doctor if it's okay to take Tylenol o ibuprofen    Take over the counter medications for your symptoms, as directed by your doctor. You may also talk to your pharmacist.      If you have other health problems (like cancer, heart failure, an organ transplant or severe kidney disease): Call your specialty clinic if you don't feel better in the next 2 days.    Know when to call 911. Emergency warning signs include:  o Trouble breathing or shortness of breath  o Pain or pressure in the chest that doesn't go away  o Feeling confused like you haven't felt before, or not being able to wake up  o Bluish-colored lips or face    Where can I get more information?    [facility] - About COVID-19: [email or web address]     CDC - What to Do If You're Sick:     www.cdc.gov/coronavirus/2019-ncov/about/steps-when-sick.html    CDC - Ending Home Isolation:  https://www.cdc.gov/coronavirus/2019-ncov/your-health/quarantine-isolation.html    CDC - Caring for Someone:  www.cdc.gov/coronavirus/2019-ncov/if-you-are-sick/care-for-someone.html    [facility] clinical trials (COVID-19 research studies): [email or web address]-clinical-trials    Below are the COVID-19 hotlines at the [facility] of Health ([facility]). Interpreters are available.  o For health questions: Call 081-846-9835 or 1-422.874.2408 (7 a.m. to 7 p.m.)  o For questions about schools and childcare: Call 582-142-0098 or 1-525.903.4425 (7 a.m. to 7 p.m.)  January 3, 2022  RE:  Pablo Ferrari                                                                                                                  [address] [address]      To whom it may concern:    I evaluated Pablo Ferrari on January 3, 2022. Pablo Ferrari should be excused from work/school.     They should let their workplace manager and staffing office know when their quarantine ends.    We can not give an  exact date as it depends on the information below. They can calculate this on their own or work with their manager/staffing office to calculate this. (For example if they were exposed on 10/04, they would have to quarantine for 14 full days. That would be through 10/18. They could return on 10/19.)    Quarantine Guidelines:    If patient receives a positive COVID-19 test result, they should follow the guidance of those who are giving the results. Usually the return to work is 10 (or in some cases 20 days from symptom onset.) If they work at [employer], they must be cleared by Employee Occupational Health and Safety to return to work.      If patient receives a negative COVID-19 test result and did not have a high risk exposure to someone with a known positive COVID-19 test, they can return to work once they're free of fever for 24 hours without fever-reducing medication and their symptoms are improving or resolved.    If patient receives a negative COVID-19 test and if they had a high risk exposure to someone who has tested positive for COVID-19 then they can return to work 14 days after their last contact with the positive individual    Note: If there is ongoing exposure to the covid positive person, this quarantine period may be longer than 14 days. (For example, if they are continually exposed to their child, who tested positive and cannot isolate from them, then the quarantine of 7-14 days can't start until their child is no longer contagious. This is typically 10 days from onset to the child's symptoms. So the total duration may be 17-24 days in this case.)     Sincerely,  Froilan Walter PA-C          o

## 2022-01-21 ENCOUNTER — DOCUMENTATION ONLY (OUTPATIENT)
Dept: PSYCHOLOGY | Facility: CLINIC | Age: 57
End: 2022-01-21
Payer: COMMERCIAL

## 2022-01-21 DIAGNOSIS — F41.1 GENERALIZED ANXIETY DISORDER: ICD-10-CM

## 2022-01-21 DIAGNOSIS — F90.0 ATTENTION DEFICIT HYPERACTIVITY DISORDER (ADHD), PREDOMINANTLY INATTENTIVE TYPE: Primary | ICD-10-CM

## 2022-01-21 DIAGNOSIS — F34.1: ICD-10-CM

## 2022-01-21 NOTE — PROGRESS NOTES
[facility]  ADHD Evaluation         Patient: Pablo Ferrari   YOB: 1965  MRN: [id number]  Date(s) of assessment:  Diagnostic Assessment 9/15/2021, Isabel self-report and collateral measures scored and interpreted 10/18/2021 and MMPI 11/05/2021    Information about appointment:  Client attended two  sessions to aid in determining client's mental health diagnosis or diagnoses and treatment recommendations that best address client concerns. Client records including medical were reviewed. A diagnostic assessment was conducted at the initial appointment. Client completed several rating scales to assist in assessing attention-related and other mental health symptoms that may be causing impairments in functioning. Rating scales were also completed by a collateral contact.    Assessment tools:      Isabel Adult ADHD Rating Scale-IV: Self and Other Reports (BAARS-IV), Isabel Functional Impairment Scale: Self and Other Reports (BFIS), Isabel Deficits in Executive Functioning Scale: Self and Other Reports (BDEFS), Patient Health Questionnaire-9 (PHQ-9), Generalized Anxiety Disorder-7 (SUN-7) and [place] Multiphasic Personality Inventory (MMPI)    ADHD Assessment tools have been completed remotely as in person observations were not possible.    Assessment Results:    Behavioral Observations:  The Patient arrived early for all appointments and scheduled follow-up appointments efficiently. The Patient appeared motivated to complete the assessment and was polite in interactions. He was oriented by three. He appeared to experience difficulty with attention during sessions. The following results are likely to be an accurate reflection of Patient's current functioning.      Isabel Adult ADHD Rating Scale-IV: Self and Other Reports (BAARS-IV)  The BAARS-IV assesses for symptoms of ADHD that are experienced in one's daily life. This assessment measure includes self and collateral rating scales  "designed to provide information regarding current and childhood symptoms of ADHD including inattention, hyperactivity, and impulsivity. Self-report scores are reported as percentiles. Scores at the 76th-83rd percentile are considered marginal, scores at the 84th-92nd percentile are considered borderline, scores at the 93rd-95th percentile are considered mild, scores at the 96th-98th percentile are considered moderate, and those at the 99th percentile are considered severe. Collateral or \"other\" rating scales are reported as number of symptoms observed in comparison to those reported by the client. Norms and percentile scores are not available for collateral reports.      Current Symptoms Scale--Self Report:   Client completed the self-report inventory of current symptoms. The results indicate that the client's Total ADHD Score was 42 which places him in the 97th percentile for overall ADHD symptoms. In addition, the client endorsed 8/9 (99th percentile) Inattention symptoms, 1/9 (86th percentile) Hyperactivity-Impulsivity symptoms, and 7/9 (97th percentile) Sluggish Cognitive Tempo symptoms. Client indicated that the reported symptoms have resulted in impaired functioning in school, home and work. Overall, the results suggest the client is experiencing Moderate ADHD symptoms.      Current Symptoms Scale--Other Report:  Client's friend/coworker completed the collateral report inventory of current symptoms. Based on the collateral contact's observation of symptoms, the client demonstrates 6/9 Inattention symptoms, 0/5 Hyperactivity, 0/4 Impulsivity symptoms, and 1/9 Sluggish Cognitive Tempo symptoms. The client's Total ADHD Score was 39. The collateral contact indicated the client demonstrates impaired functioning in work and social relationships. The collateral- and self-report scores are significantly different for Sluggish Cognitive Tempo and are within normal limits for all other areas.      Current Symptoms " Scale--Other Report:  Client's parents completed the collateral report inventory of current symptoms. Based on the collateral contact's observation of symptoms, the client demonstrates 0/9 Inattention symptoms, 0/5 Hyperactivity, 0/4 Impulsivity symptoms, and 0/9 Sluggish Cognitive Tempo symptoms. The client's Total ADHD Score was 24. The collateral contact indicated the client demonstrates impaired functioning in no areas The collateral- and self-report scores are significantly different for Inattention, Total ADHD, and sluggish cognitive tempo.      Childhood Symptoms Scale--Self-Report:  Client completed the self-report inventory of childhood symptoms. The results indicate that the client's Total ADHD Score was 33 which places him in the 81st percentile for overall ADHD symptoms in childhood. In addition, the client endorsed 5/9 (94th percentile) Inattention symptoms and  0/9 (1st-75th percentile) Hyperactivity-Impulsivity symptoms. Client indicated that the reported symptoms resulted in impaired functioning in school, home and social relationships Overall, the results suggest the client experienced  Borderline to Mild symptoms of ADHD as a child.      Childhood Symptoms Scale--Other Report:  Client's parents completed the collateral report inventory of childhood symptoms. Based on the collateral contact's recollection of client's childhood symptoms, the client demonstrated 1/9 Inattention symptoms and 0/9 Hyperactivity-Impulsivity symptoms. The client's Total ADHD Score was 26. The collateral contact indicated the client demonstrates impaired functioning in no areas. The collateral- and self-report scores are significantly different.                           Isabel Functional Impairment Scale: Self and Other Reports (BFIS)  The BFIS is used to assess an individuals' psychosocial impairment in major life/daily activities that may be due to a mental health disorder. This assessment measure includes self and  "collateral rating scales. Self-report scores are reported as percentiles. Scores at the 76th-83rd percentile are considered marginal, scores at the 84th-92nd percentile are considered borderline, scores at the 93rd-95th percentile are considered mild, scores at the 96th-98th percentile are considered moderate, and those at the 99th percentile are considered severe.Collateral or \"other\" rating scales are reported as number of symptoms observed in comparison to those reported by the client. Norms and percentile scores are not available for collateral reports.      Results indicate the client identified impairment (scores at or greater than 93rd percentile) in the following areas: home-chores, work, social-strangers, driving, daily responsibilities, self-care routines and health maintenance. The client's Mean Impairment Score was 6.5 (98th percentile) indicating the client is reporting Moderate impairment in functioning across domains. Client's friend/coworker completed the collateral rating scale, which indicated discrepant results. The collateral contact's scores were generally lower than the client's report. Client's parents completed the collateral rating scale, which indicated discrepant results. The collateral contact's scores were generally lower than the client's report.           Isabel Deficits in Executive Functioning Scale (BDEFS)  The BDEFS is a measure used for evaluating dimensions of adult executive functioning in daily life.This assessment measure includes self and collateral rating scales. Self-report scores are reported as percentiles. Scores at the 76th-83rd percentile are considered marginal, scores at the 84th-92nd percentile are considered borderline, scores at the 93rd-95th percentile are considered mild, scores at the 96th-98th percentile are considered moderate, and those at the 99th percentile are considered severe.Collateral or \"other\" rating scales are reported as number of symptoms " observed in comparison to those reported by the client. Norms and percentile scores are not available for collateral reports.      Results indicate the client's Total Executive Functioning Score was 240  (99th percentile). The ADHD-Executive Functioning Index score was 30 (99th percentile). These scores suggest the client has Severe deficits in executive functioning. These deficits may be due to ADHD. Results indicate the client identified significant deficits in the following areas: self-management to time 99th , self-organization/problem-solving 97th,  self-restraint 97th, self-motivation 93rd and self-regulation of emotions 97th. Client's friend/coworker completed the collateral rating scale, which indicated similar results. The collateral contact's scores were generally lower than the client's report. Client's parents completed the collateral rating scale, which indicated discrepant results. The collateral contact's scores were generally lower than the client's report.       Summary of MMPI-2 Results      Patient completed the [place] Multiphasic Personality Inventory-2 (MMPI-2), a self-report personality inventory, as a part of the psychological assessment for ruling out Attention Deficit disorders.  Validity scales indicate that the Patient responded in an open and consistent manner, resulting in a valid profile.  His responses yielded a profile that is consistent with people who report difficulty concentrating, depression, anxiety, socially withdrawn, and somatic symptoms.     Patient responded similarly to people who report depression, suicidal ideation, and feelings of unworthiness and inadequacy. They tend to be introverted, feel guilty, and have a slow personal tempo. Moreover, they tend to lack self- confidence and lacks interests. People like this are often submissive, compliant, and emotionally over- controlled. They often are insecure, pessimistic, and have a low self-esteem. They also tend to  experience demoralization that leads to expectations and/or perceptions of failure. People like this often feel uncertain about the future and disinterested in their lives. They feel unsupported by others. They are unhappy, brood, cry easily, and feel hopeless and empty. They may report thoughts of suicide and the wish to be dead. Moreover, they tend to experience low opinions of themselves and do not feel they are liked or are important. They tend to feel unattractive, awkward and clumsy, useless, and like a burden. They also lack self-confidence and find it hard to accept compliments. People like this feel uneasy around others and prefer to be by themselves. They tend to isolate in social situations and view themselves as shy often avoiding social or group situations. People like this are often described as internalizing and introverted. They also tend to lead a careful and cautious lifestyle. Furthermore, they tend to avoid aversive events, are worry-prone, are overly self-critical, catastrophize, and focus on the negative.     Patient s answers are similar to people who report fear, anxiety, tension, intruding thoughts, difficulty concentrating, and obsessions and compulsions.      People who responded similarly to the patient, report somatic symptoms and chronic pain. Moreover, they tend to lack insight concerning causes of the symptoms. They also often have sleep difficulties.     Patient s answers are similar to people who engage in anti-social behaviors and experience trouble with the law. People like this often have difficulty adjusting to life after high school.  Moreover, they tend to have poor concentration, obsessiveness, and indecisiveness. They feel a lack of family support for their career, question their choice of career, and have negative attitudes toward coworkers. Furthermore, they often experience negative attitudes towards doctors and mental health treatment. People like this often feel a  dissatisfaction with their romantic relationship.      Patient responded similarly to people who report feeling confused and experience disorganized thinking. People like this report experiencing hallucinations and/or delusions, and they have impaired contact with reality. Patient responded similarly to people who have brief acute psychotic episodes and are tense, nervous, fearful, depressed, despondent, hopeless, and suicidal. They tend to have blunted or inappropriate affect. Moreover, they tend to experience problems concentrating and attending. People like this are often isolated, shy, withdrawn, introverted, and lack basic social skills. They tend to feel inadequate and inferior, and set high standards for themself and feel guilty when the standards are not met. People like this tend to be interested in obscure subjects.        Results of testing were consistent with his report upon direct interview.         Generalized Anxiety Disorder Questionnaire (SUN-7)  This questionnaire is designed to assess for anxiety in adults.  Based on the score, he is experiencing mild symptoms of anxiety. Client identified the following symptoms of anxiety: feeling on edge/nervous/anxious, difficulty controlling worry, worrying about many different things, trouble relaxing, becoming easily annoyed or irritable and feeling something awful might happen.    Patient Health Questionnaire- 9 (PHQ-9)   This questionnaire is designed to assess for depression in adults.  Based on the score, he is experiencing mild symptoms of depression. Client identified the following symptoms of depression: depressed mood, lack of interest, difficulty with sleep, poor appetite or overeating, feeling bad about self, poor concentration and restlessness or lethargy.           Summary (based on clinical interview, review of records, test results):    Identifying Information:  Patient is a 55 year old,.  The pronoun use throughout this assessment  "reflects the patient's chosen pronoun.  Patient was referred for an assessment by self.  Patient attended the session alone. Patient's current PCP is    Meng Ko DO  Family Medicine            Chief Complaint:   The purpose of this evaluation is to: provide treatment recommendations and clarify diagnosis. Patient reported seeking services at this time for diagnostic assessment and recommendations for treatment.  Patient reported that he has not completed a previous ADHD diagnostic assessment.  Patient has received a previous diagnosis of Depression, Anxiety, Social Anxiety, and Avoidant Personality Disorder. Patient reported that he has never taken psychotropic medication. Patient reported that he saw a therapist in college and he saw a therapist in 2018 for approximately one year through [email or web address].     Patient reported the following symptoms: difficulty with task completion, motivation to begin tasks, forgetful, and easily distracted. Patient reported that his symptoms began as early as [employer] and worsened in college.         Social/Family History:  Patient reported they grew up in [place].  They were raised by biological parents  .  Parents were always together.  Patient reported that their childhood was \"fine.\" Patient reported that he was \"bullied a lot\" at school and his parents did not address issues at home. Patient reported that his father disapproved \"of fighting\" so the patient was \"viewed as an easy target.\"  Patient described their current relationships with family of origin as close with his parents. Patient reported that he is the oldest of two children. Patient reported that he speaks with his sister regularly.      Patient described his childhood family environment as nurturing and stable.  As a child, patient reported that he failed to complete assigned chores in the home environment, had problems getting ready for school in the morning, had problems with " "organization and keeping track of items, misplaced or lost things and had problems managing temper with frequent emotional outbursts. Patient reported difficulty with childhood peer relationships. Patient reported that as a child, he would often become distracted with trying to get ready for bed.      The patient describes their cultural background as .  Cultural influences and impact on patient's life structure, values, norms, and healthcare: \"nobody talked about mental health in those days.\" Patient reported that he had a bee sting allergy and so he \"got a shot once a week for about one year.\"  Contextual influences on patient's health include: Family Factors seeks help when needed and Health- Seeking Factors seeks help when needed.    These factors will be addressed in the Preliminary Treatment plan.  Patient identified their preferred language to be English. Patient reported they does not need the assistance of an  or other support involved in therapy.      Patient reported experienced significant delays in developmental tasks, such as attention issues.   Patient's highest education level was some college. Patient reported that he attended approximately three years of college. Patient reported that he did \"fairly well at first\" and then did not go to class and had trouble with concentration. Patient reported that he would \"get a bit behind on the work and then would get freaked out and stop going to the class.\" Patient reported that he was attending the  of  and then \"was asked to leave, because of grades.\" Patient said  \"I had a hard time making myself do the homework and my grades got increasingly worse and worse.\" He reported that he did not do the homework and \"there would be so much that I would be freaked out and then I would stop going to class.\" He reported that he would then fail the class or drop out.  Patient reported that he attended \"some extension classes at the [employer] and classes " "at [employer]\" and he would \"fall apart after a while.\" Patient reported feeling \"pressure\" and that he \"could not keep up\" with assignments in college. Patient reported that he went to college on and off for \"probably for 15 years and then gave up for good.\" Patient reported that he often procrastinated with assignments, felt pressure, and then would \"freak out and stop going to class.\"     Patient identified the following learning problems: attention and concentration.  Modifications will not be used to assist communication in therapy.  Patient reports they are able to understand written materials. Patient did not receive tutoring services during the school years. Patient did not receive special education services. Patient  reported experiencing trouble with completing assignments in high school, but not in college. Patient reported that in high school, he would do most of his work during class.  Patient did attend post secondary school. Patient reported that he went to a public school and in high school, he \"was able to get by without a lot of work.\"     Patient reported the following relationship history no long term partners.  Patient's current relationship status is single for life. Patient identified their sexual orientation as heterosexual.  Patient reported having o child(eloina). Patient identified his parents and sister as part of their support system.  Patient identified the quality of these relationships as good.        Patient's current living/housing situation involves staying in own home/apartment. Patient lives alone and they report that housing is stable.     Patient is currently employed full time.  Patient reports their finances are obtained through employment. 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  The longest period of employment has been 35 years.  Client has been terminated from a place of employment. Patient reported that he was terminated due to mistakes due to " "inattention. Patient does not identify finances as a current stressor.          ADHD Related History:   As a child, Patient reported having sleep disturbance, including: daytime drowsiness / fatigue, insomnia and difficulty stopping reading to go to sleep . Patient reported currently experiencing sleep disturbance, including: insomnia, snoring and teeth grinding.  Client reported sleeping approximately 6.5-7.5 hours per night.  Patient  reported that he has completed a sleep study. Patient reported that the results were inconclusive; he reported that they said he sleeps \"inefficiently.\"  Patient reported having a well balanced diet, cravings for sweets and sometimes binges on sweets. Patient reported that he does not have concerns for his weight or diabetes. There are not significant nutritional concerns.  Patient reported engaging in regular exercise.     Patient graduated high school in 1983 with approximately a 2.7 GPA.  During the elementary, middle, and high school years, patient recalls academic strengths in the area of math. Patient reported experiencing academic problems in no areas recalled.  Patient reported that he tended to complete his work at school.  Patient said \"I have bad working memory.\" Patient reported that he often forgets items at the grocery store. Patient reported that he recently went to see his doctor and forgot to talk about one of his medical issues.      Patient reported that he been frequently late for work, frequently made mistakes with poor attention to detail, often felt bored, often been late in completing projects, disorganized behavior and distractible behavior .  Patient reported that when he started with his employer in the 1980s, his work shift was at 8:00AM. Patient reported that there was not a formal conversation, but due to him routinely showing up later, he usually started around 9:00AM or 9:15AM.      Patient reported that they have not been involved with the legal " "system. Patient does not being under probation/ parole/ jurisdiction. They are not under any current court jurisdiction. .     Patient has received a 's license.  Patient has received moving violations, includin speeding tickets. Patient reported that when he was \"young\" he would drive \"too fast\" his \"attention would wander\" and then he would get into accidents.   Patient reported the following driving habits: fails to obey traffic signs and laws, frequently late for appointments, meetings, or work, gets lost easily and often exceeds the speed limit / speeds.  According to client, he is unsure if people are comfortable riding as passengers when he is driving, because he rarely has passengers in his car.      Patient's Strengths and Limitations:  Patient identified the following strengths or resources that will help them succeed in treatment: commitment to health and well being and friends / good social support. Things that may interfere with the patient's success in treatment include: none identified.      Personal and Family Medical History:  Patient does report a family history of mental health concerns.  Patient reports family history is not on file.     Patient reported that his mother has depression and anxiety. Patient reported that he wonders if his mother has ADHD.     Patient does report Mental Health Diagnosis and/or Treatment.  Patient reported the following previous diagnoses which include(s): Depression, Anxiety, Social Anxiety, and Avoidant Personality Disorder. Patient reported symptoms began in elementary school or [employer].   Patient has received mental health services in the past: therapy with therapist at [facility].  Psychiatric Hospitalizations: None.  Patient denies a history of civil commitment.  Patient is not receiving other mental health services.        Patient has had a physical exam to rule out medical causes for current symptoms.  Date of last physical exam was within " the past year. Client was encouraged to follow up with PCP if symptoms were to develop. The patient has a [facility] Provider, who is named:     Meng Ko DO     Family Medicine              Patient reports the following current medical concerns: bladder issue and the following current dental concerns: teeth grinding and his mouth guard is not comfortable.  Patient denies any issues with pain.  There are not significant appetite / nutritional concerns / weight changes.   Patient does not report a history of head injury / trauma / cognitive impairment.       Oxybutynin Chloride      Medication Adherence:  Patient reports taking.     Patient does not have psychotropic medications.      Patient Allergies:  No Known Allergies     Medical History:  No past medical history on file.           Rating Scales:     PHQ 9/7/2021 9/13/2021   PHQ-9 Total Score 7 9   Q9: Thoughts of better off dead/self-harm past 2 weeks Not at all Not at all       SUN-7 SCORE 9/7/2021 9/13/2021   Total Score 7 (mild anxiety) 7 (mild anxiety)   Total Score 7 7        Substance Use:  Patient did not report a family history of substance use concerns; see medical history section for details.  Patient has not received chemical dependency treatment in the past.  Patient has not ever been to detox.       Patient is not currently receiving any chemical dependency treatment.               Substance History of use Age of first use Date of last use       Pattern and duration of use (include amounts and frequency)   Alcohol currently use    20 09/02/21 Patient reported that he tends to drink approximately two alcohol drinks every two weeks.       Cannabis    never used     NA      Amphetamines    never used     NA   Cocaine/crack     never used       NA   Hallucinogens never used         NA   Inhalants never used         NA   Heroin never used         NA   Other Opiates never used     NA   Benzodiazepine    never used     NA   Barbiturates  "never used     NA   Over the counter meds used in the past 0 01/01/00    Patient reported that he took over the counter medication as prescribed.   Caffeine currently use 17   Patient reported that he consumes approximately 2 cups of coffee and 3 cups of tea each day.    Nicotine  never used     NA   Other substances not listed above:  Identify:  never used     NA      Patient reported the following problems as a result of their substance use: no problems, not applicable.     CAGE- AID:    CAGE-AID Total Score 9/7/2021   Total Score 1   Total Score MyChart 1 (A total score of 2 or greater is considered clinically significant)         Substance Use: Patient reported that he may have headaches or irritability if he stopped drinking caffeine   .  Based on the negative CAGE score and clinical interview there are not indications of drug or alcohol abuse.         Significant Losses / Trauma / Abuse / Neglect Issues:   Patient did not serve in the .  There are indications or report of significant loss, trauma, abuse or neglect issues related to: bullied in [employer].  Concerns for possible neglect are not present.      Safety Assessment:   Current Safety Concerns:  Gillespie Suicide Severity Rating Scale (Lifetime/Recent)  Gillespie Suicide Severity Rating (Lifetime/Recent) 9/7/2021   1. Wish to be Dead (Lifetime) No   Has subject engaged in non-suicidal self-injurious behavior? (Lifetime) No      Patient denies current homicidal ideation and behaviors.  Patient denies current self-injurious ideation and behaviors.    Patient denied risk behaviors associated with substance use.  Patient reports high risk behaviors associated with mental health symptoms, such as driving \"too fast\"  Patient reports the following current concerns for their personal safety: None.  Patient reports there are not firearms in the house.            History of Safety Concerns:  Patient denied a history of homicidal ideation.   "   Patient denied a history of personal safety concerns.    Patient denied a history of assaultive behaviors.    Patient denied a history of sexual assault behaviors.     Patient denied a history of risk behaviors associated with substance use.  Patient reported a history of reckless driving associated with mental health symptoms.  Patient reports the following protective factors: effectively controls impulses     Risk Plan:  See Recommendations for Safety and Risk Management Plan     Review of Symptoms per patient report:  Depression:     Change in sleep, Lack of interest, Excessive or inappropriate guilt, Change in energy level, Difficulties concentrating and Change in appetite  Myrna:             Irritability  Psychosis:       No Symptoms  Anxiety:           Excessive worry, Nervousness, Poor concentration and Irritability  Panic:              No symptoms  Post Traumatic Stress Disorder:  No Symptoms   Eating Disorder:          No Symptoms  ADD / ADHD:              Inattentive, Difficulties listening, Poor task completion, Poor organizational skills, Distractibility and Forgetful  Conduct Disorder:       No symptoms  Autism Spectrum Disorder:     Deficits in social communication and social interactions and Deficits in social-emotional reciprocity  Obsessive Compulsive Disorder:       No Symptoms     Patient reports the following compulsive behaviors and treatment history: NA      Diagnostic Criteria:      A. Excessive anxiety and worry about a number of events or activities (such as work or school performance).   B. The person finds it difficult to control the worry.  C. Select 3 or more symptoms (required for diagnosis). Only one item is required in children.   - Being easily fatigued.    - Difficulty concentrating or mind going blank.    - Irritability.   D. The focus of the anxiety and worry is not confined to features of an Axis I disorder.  E. The anxiety, worry, or physical symptoms cause clinically  significant distress or impairment in social, occupational, or other important areas of functioning.   F. The disturbance is not due to the direct physiological effects of a substance (e.g., a drug of abuse, a medication) or a general medical condition (e.g., hyperthyroidism) and does not occur exclusively during a Mood Disorder, a Psychotic Disorder, or a Pervasive Developmental Disorder.    - The aformentioned symptoms began 35 year(s) ago and occurs 7 days per week and is experienced as mild.  A. Depressed mood for most of the day, for more days than not, as indicated either by subjective account or observation by others, for at least 2 years. Note: In children and adolescents, mood can be irritable and duration must be at least 1 year.   B. Presence, while depressed, of two (or more) of the following:        - poor appetite or overeating        - low energy or fatigue        - low self-esteem        - poor concentration or difficulty making decisions   C. During the 2-year period (1 year for children or adolescents) of the disturbance, the person has never been without the symptoms in Criteria A and B for more than 2 months at a time.  D. Criteria for a major depressive disorder may be continuously present for 2 years  E. There has never been a Manic Episode, a Mixed Episode, or a Hypomanic Episode, and criteria have never been met for Cyclothymic Disorder.   F. The disturbance is not better explained by a persistent schizoaffective disorder, schizophrenia, delusional disorder, or other specified or unspecified schizophrenia spectrum and other psychotic disorder  G. The symptoms are not attributable to the physiological effects of a substance (e.g., a drug of abuse, a medication) or another medical condition (e.g., hypothyroidism).   H. The symptoms cause clinically significant distress or impairment in social, occupational, or other important areas of functioning.        - Early Onset: onset is before age 21  years          Attention Deficit Hyperactivity-Impulsivity Disorder Diagnostic Criteria:     Inattention: Six or more symptoms of inattention for children up to age 16, or five or more for adolescents 17 and older and adults; symptoms of inattention have been present for at least 6 months, and they are inappropriate for developmental level:    -Often fails to give close attention to details or makes careless mistakes in schoolwork, at work, or with other activities. YES  -Often has trouble holding attention on tasks or play activities. YES  -Often does not seem to listen when spoken to directly. YES  -Often does not follow through on instructions and fails to finish schoolwork, chores, or duties in the workplace (e.g., loses focus, side-tracked). YES  -Often has trouble organizing tasks and activities. YES  -Often avoids, dislikes, or is reluctant to do tasks that require mental effort over a long period of time (such as schoolwork or homework).  -Often loses things necessary for tasks and activities (e.g. school materials, pencils, books, tools, wallets, keys, paperwork, eyeglasses, mobile telephones).  -Is often easily distracted YES  -Is often forgetful in daily activities. YES    Hyperactivity and Impulsivity: Six or more symptoms of hyperactivity-impulsivity for children up to age 16, or five or more for adolescents 17 and older and adults; symptoms of hyperactivity-impulsivity have been present for at least 6 months to an extent that is disruptive and inappropriate for the person s developmental level:    -Often fidgets with or taps hands or feet, or squirms in seat.  -Often leaves seat in situations when remaining seated is expected.  -Often runs about or climbs in situations where it is not appropriate (adolescents or adults may be limited to feeling restless).  -Often unable to play or take part in leisure activities quietly.  -Is often  on the go  acting as if  driven by a motor .  -Often talks  excessively.  YES  -Often blurts out an answer before a question has been completed.  -Often has trouble waiting his/her turn.  -Often interrupts or intrudes on others (e.g., butts into conversations or games)    In addition, the following conditions must be met:    -Several inattentive or hyperactive-impulsive symptoms were present before age 12 years.  YES  -Several symptoms are present in two or more setting, (e.g., at home, school or work; with friends or relatives; in other activities).  YES  -There is clear evidence that the symptoms interfere with, or reduce the quality of, social, school, or work functioning.  YES  -The symptoms do not happen only during the course of schizophrenia or another psychotic disorder. The symptoms are not better explained by another mental disorder (e.g. Mood Disorder, Anxiety Disorder, Dissociative Disorder, or a Personality Disorder).  YES        Predominantly Inattentive Presentation: if enough symptoms of inattention, but not hyperactivity-impulsivity, were present for the past six months  YES        Functional Status:  Patient reports the following functional impairments: health maintenance, management of the household and or completion of tasks, operation of a motor vehicle, organization, relationship(s), self-care, social interactions and work / vocational responsibilities.           WHODAS:   WHODAS 2.0 Total Score 9/3/2021   Total Score 24   Total Score MyChart 24        In conclusion, results of testing were significant for inattention, depression, and anxiety. Rating scales suggested that the client is currently experiencing symptoms of an inattention that have been present to some extent since childhood. The client endorsed moderate overall symptoms of inattention and hyperactivity-impulsivity. The client endorsed inattention more often than he did hyperactivity-impulsivity. The client's parents did not endorse symptoms for his childhood or current abilities. However,  the client's co-worker reported inattention for his current abilities.  The client reported scores of moderate functional impairment. This means that the client likely  Has difficulty functioning with his current abilities. He significantly struggles with home-chores, work, social-strangers, driving, daily responsibilities, self-care routines and health maintenance. Additionally, he reported scores of severe executive functioning difficulties. He reported significant deficits for self-management to time, self-organization/problem-solving,  self-restraint, self-motivation, and self-regulation of emotions. Personality testing was suggestive of the client experiencing difficulty with concentration, depression, anxiety, social withdrawal, and somatic symptoms. Additionally, the client is experiencing symptoms of anxiety and depression, which were reported on questionnaires.    Patient meets diagnostic criteria for ADHD as evidenced above per questionnaires and clinical interview of social history.Patient's collateral  for his current functioning indicated mostly similar symptoms. Patient's parents reported significantly fewer symptoms that the patient did for both his current level of functioning and for his childhood.     Patient no showed his ADHD Assessment feedback appointment on 12/8/2021 and did not reschedule when  [employer] employees attempted to reach out to him to reschedule the appointment. Hence, he is not aware of final diagnoses and the feedback has not been completed. Billing for services completed thus far.          DSM5 Diagnoses: (Sustained by DSM5 Criteria Listed Above)  Diagnoses: Attention-Deficit/Hyperactivity Disorder  314.00 (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   Rule Out Schizoid Personality Disorder, Schizotypal Personality Disorder, Avoidant Personality Disorder,  Social Anxiety Disorder, Insomnia    Psychosocial & Contextual Factors: Work Related Stress      Recommendations:     1. Schedule an appointment with your physician to discuss a medication evaluation.  2. Access resources through websites, books, and articles such as those provided in the handout.  3. Consider working with an ADHD  or individual therapist to learn skills to  assist with symptom management, as well as ways to improve relationships,  etc that may have been impacted by your symptoms.  4. Consider attending workshops or support groups through [facility] ([employer]).  5. Individual therapy is recommended for the treatment of anxiety and depression. Therapies focusing on identifying and challenging problematic thought processes can be beneficial.   6. Schedule a follow-up appointment with me in about six weeks to review symptoms, treatment involvement, and struggles and/or successes. A follow up appointment would be recommended following a feedback session.       Gregoria Mota Psy.D, LP    1/21/2022     Psychological Testing   Billing/Services Summary       Testing Evaluation Services Base: 32226  (1st 60 mins) Add-on: 03548  (each addtl 60 mins)   Record Review and Clarify Referral Question   9/3/2021 3:25-3:30PM 5 minutes   Integration/Report Generation   10/18/21 6:30-7:30PM [facility] (60)  11/2/21 2:10-3:05PM MMP (55)  11/8/21 11:00-11:45AM, 1/21/2022 7:45-8:00AM Integrated Report (60) 175 minutes   Interactive Feedback Session  NA 0 minutes   Post-Service Work   NA 0 minutes   Total Time: 180 minutes (3 hours, 0 minutes)   Total Units: 1 2           Diagnosis(es): (ICD-10) Attention-Deficit/Hyperactivity Disorder  314.00 (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

## 2022-01-28 ENCOUNTER — DOCUMENTATION ONLY (OUTPATIENT)
Dept: PSYCHOLOGY | Facility: CLINIC | Age: 57
End: 2022-01-28
Payer: COMMERCIAL

## 2022-01-28 ENCOUNTER — VIRTUAL VISIT (OUTPATIENT)
Dept: PSYCHOLOGY | Facility: CLINIC | Age: 57
End: 2022-01-28
Payer: COMMERCIAL

## 2022-01-28 DIAGNOSIS — F41.1 GENERALIZED ANXIETY DISORDER: ICD-10-CM

## 2022-01-28 DIAGNOSIS — F34.1: ICD-10-CM

## 2022-01-28 DIAGNOSIS — F90.0 ATTENTION DEFICIT HYPERACTIVITY DISORDER (ADHD), PREDOMINANTLY INATTENTIVE TYPE: Primary | ICD-10-CM

## 2022-01-28 ASSESSMENT — ANXIETY QUESTIONNAIRES
2. NOT BEING ABLE TO STOP OR CONTROL WORRYING: SEVERAL DAYS
7. FEELING AFRAID AS IF SOMETHING AWFUL MIGHT HAPPEN: SEVERAL DAYS
1. FEELING NERVOUS, ANXIOUS, OR ON EDGE: SEVERAL DAYS
GAD7 TOTAL SCORE: 6
6. BECOMING EASILY ANNOYED OR IRRITABLE: SEVERAL DAYS
IF YOU CHECKED OFF ANY PROBLEMS ON THIS QUESTIONNAIRE, HOW DIFFICULT HAVE THESE PROBLEMS MADE IT FOR YOU TO DO YOUR WORK, TAKE CARE OF THINGS AT HOME, OR GET ALONG WITH OTHER PEOPLE: SOMEWHAT DIFFICULT
3. WORRYING TOO MUCH ABOUT DIFFERENT THINGS: SEVERAL DAYS
5. BEING SO RESTLESS THAT IT IS HARD TO SIT STILL: NOT AT ALL

## 2022-01-28 ASSESSMENT — PATIENT HEALTH QUESTIONNAIRE - PHQ9
SUM OF ALL RESPONSES TO PHQ QUESTIONS 1-9: 8
SUM OF ALL RESPONSES TO PHQ QUESTIONS 1-9: 8
10. IF YOU CHECKED OFF ANY PROBLEMS, HOW DIFFICULT HAVE THESE PROBLEMS MADE IT FOR YOU TO DO YOUR WORK, TAKE CARE OF THINGS AT HOME, OR GET ALONG WITH OTHER PEOPLE: SOMEWHAT DIFFICULT
5. POOR APPETITE OR OVEREATING: SEVERAL DAYS

## 2022-01-28 NOTE — Clinical Note
Greetings,    We completed the ADHD assessment and the patient was diagnosed with ADHD, Inattentive type.     In addition, they were diagnosed with: Persistent Depression and Anxiety    I encouraged them to schedule with you to discuss medication options.     Their full report is available for your review.     Please let me know if you have any questions.    Gregoria Mota PsyD LP

## 2022-01-28 NOTE — PROGRESS NOTES
Progress Note    Patient Name: Pablo Ferrari   Date: 2022              Service Type: Individual for ADHD Feedback      Session Start Time: 1:00PM  Session End Time: 2:00PM     Session Length: 60 minutes    Session #: ADHD Feedback    Attendees: Client attended alone    Service Modality:  Video Visit:      Provider verified identity through the following two step process.  Patient provided:  Patient     Telemedicine Visit: The patient's condition can be safely assessed and treated via synchronous audio and visual telemedicine encounter.      Reason for Telemedicine Visit: Services only offered telehealth    Originating Site (Patient Location): Patient's home    Distant Site (Provider Location): Provider Remote Setting- Home Office    Consent:  The patient/guardian has verbally consented to: the potential risks and benefits of telemedicine (video visit) versus in person care; bill my insurance or make self-payment for services provided; and responsibility for payment of non-covered services.     Patient would like the video invitation sent by:  My Chart    Mode of Communication:  Video Conference via [facility]    As the provider I attest to compliance with applicable laws and regulations related to telemedicine.           PHQ 2021   PHQ-9 Total Score 7 9 8   Q9: Thoughts of better off dead/self-harm past 2 weeks Not at all Not at all Not at all         SUN-7 SCORE 2021   Total Score 7 (mild anxiety) 7 (mild anxiety) -   Total Score 7 7 6           DATA  Interactive Complexity: No   Crisis: No         Progress Since Last Session (Related to Symptoms / Goals / Homework):   Symptoms: No change for attention and concentration    Homework: Achieved / completed to satisfaction      Episode of Care Goals: Achieved / completed to satisfaction - MAINTENANCE (Working to maintain change, with risk of relapse); Intervened by  "continuing to positively reinforce healthy behavior choice      Current / Ongoing Stressors and Concerns:   Social Skill difficulties      Treatment Objective(s) Addressed in This Session:     Provided feedback on ADHD evaluation. Reviewed test results in depth and answered Patient's questions. Patient diagnosed with:    Attention-Deficit/Hyperactivity Disorder  314.00 (F90.0) Predominantly inattentive presentation;   300.4 (F34.1) Persistent Depressive Disorder, Early onset, 300.02 (F41.1) Generalized Anxiety Disorder   Defer Schizotypal Personality Disorder, Avoidant Personality Disorder, Insomnia    Reviewed ADHD symptom management handout. This provider also completed full written report of evaluation, including integration of testing data, summary, and recommendations. Please see Documentation Only dated 1/28/2022.     Intervention:   ADHD Evaluation feedback; Reviewed report (can be found in Documentation Only encounter dated 1/28/2022); Patient was appreciative of the feedback and expressed understanding of the diagnoses. He stated, \"I m okay. I guess I'm leery of the medication.\"    Patient reported that his supervisor recently announced that the patient will be taking furlough days. Patient reported that he does not have financial anxiety \"yet.\" but is concerned that the furloughs will be long term.     Discussed how the patient's parents' scores on the Isabel's questionnaires were significantly lower than the patient's questionnaires. Patient reported that he thinks his coworker's scores were more similar to his, because she sees him most days. Patient reported that he sees his parents for approximately two hours every two weeks and they \"think\" he pays attention in conversations.     Reviewed patient's responses on the MMPI. Patient reported that he is an \"outside of the box thinker.\" Patient reported that he wants to be closer with other people and is unsure why he does not have more relationships. "     Discussed how patient would like to arrive to work by 9:00AM and usually arrives at 9:30AM. Patient reported that in the morning he has to do 15 minutes of PT, becomes distracted by his phone, and sometimes becomes distracted by completing chores, such as dishes, when he is trying to leave work on time.      Rule Out Social Anxiety Disorder, Avoidant Personality Disorder, Schizoid Personality Disorder, and Schizotypal Personality Disorder.      CBT: socratic questioning, normalizing   EFT: emotion checking        ASSESSMENT: Current Emotional / Mental Status (status of significant symptoms):   Risk status (Self / Other harm or suicidal ideation)   Patient denies current fears or concerns for personal safety.   Patient denies current or recent suicidal ideation or behaviors.   Patientdenies current or recent homicidal ideation or behaviors.   Patient denies current or recent self injurious behavior or ideation.   Patient denies other safety concerns.   Patient reports there has been no change in risk factors since their last session.     Patientreports there has been no change in protective factors since their last session.     Recommended that patient call 911 or go to the local ED should there be a change in any of these risk factors.     Appearance:   Appropriate    Eye Contact:   Good    Psychomotor Behavior: Normal    Attitude:   Cooperative    Orientation:   All   Speech    Rate / Production: Normal     Volume:  Normal    Mood:    Euthymic   Affect:    Blunted    Thought Content:  Clear    Thought Form:  Coherent  Logical    Insight:    Good    Attention:   Distracted, Asked for questions to be repeated      Medication Review:   No current psychiatric medications prescribed     Medication Compliance:   NA     Changes in Health Issues:   None reported     Chemical Use Review:   Substance Use: Chemical use reviewed, no active concerns identified      Tobacco Use: No current tobacco use.       Diagnoses:  Attention-Deficit/Hyperactivity Disorder  314.00 (F90.0) Predominantly inattentive presentation;   300.4 (F34.1) Persistent Depressive Disorder, Early onset, 300.02 (F41.1) Generalized Anxiety Disorder,   Defer Schizotypal Personality Disorder, Avoidant Personality Disorder, Insomnia    Collateral Reports Completed:   Routed note to PCP      PLAN: (Patient Tasks / Therapist Tasks / Other)    Patient plans to wake up 15 minutes earlier during his work week, to put his phone into another room if it is a distraction, and to limit completing chores in the morning.         Recommendations:     1. Schedule an appointment with your physician to discuss a medication evaluation.  2. Access resources through websites, books, and articles such as those provided in the handout.  3. Consider working with an ADHD  or individual therapist to learn skills to  assist with symptom management, as well as ways to improve relationships,  etc that may have been impacted by your symptoms.  4. Consider attending workshops or support groups through [employer] ([employer]).  5. Individual therapy is recommended for the treatment of anxiety and depression. Therapies focusing on identifying and challenging problematic thought processes can be beneficial.   6. Schedule a follow-up appointment with me in about six weeks to review symptoms, treatment involvement, and struggles and/or successes. A follow up appointment would be recommended following a feedback session.      Referred to therapy with NONA Mccullough ([phone])      Gregoria Mota PsyD LP 1/28/2022                                                            Psychological Testing   Billing/Services Summary       Testing Evaluation Services Base: 15861  (1st 60 mins) Add-on: 14659  (each addtl 60 mins)   Record Review and Clarify Referral Question   9/3/2021 3:25-3:30PM 5 minutes   Integration/Report Generation   10/18/21 6:30-7:30PM [place]  (60)  11/2/21 2:10-3:05PM MMP (55)  11/8/21 11:00-11:45AM, 1/21/2022 7:45-8:00AM  1/28/2022 3:10-3:30PM Integrated Report (80) 195 minutes   Interactive Feedback Session  1/28/2022 1:00-2:00PM 60 minutes   Post-Service Work   1/28/2022 2:45-3:10PM 25 minutes   Total Time: 285 minutes (4 hours, 45 minutes)   Total Units: 1 4           Diagnosis(es): (ICD-10) Attention-Deficit/Hyperactivity Disorder  314.00 (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         ______________________________________________________________________

## 2022-01-28 NOTE — CONFIDENTIAL NOTE
[facility]  ADHD Evaluation           Patient: Pablo Ferrari   YOB: 1965  MRN: [id number]  Date(s) of assessment:  Diagnostic Assessment 9/15/2021, Isabel self-report and collateral measures scored and interpreted 10/18/2021 and MMPI 11/05/2021     Information about appointment:  Client attended two  sessions to aid in determining client's mental health diagnosis or diagnoses and treatment recommendations that best address client concerns. Client records including medical were reviewed. A diagnostic assessment was conducted at the initial appointment. Client completed several rating scales to assist in assessing attention-related and other mental health symptoms that may be causing impairments in functioning. Rating scales were also completed by a collateral contact.     Assessment tools:       Isabel Adult ADHD Rating Scale-IV: Self and Other Reports (BAARS-IV), Isabel Functional Impairment Scale: Self and Other Reports (BFIS), Isabel Deficits in Executive Functioning Scale: Self and Other Reports (BDEFS), Patient Health Questionnaire-9 (PHQ-9), Generalized Anxiety Disorder-7 (SUN-7) and [place] Multiphasic Personality Inventory (MMPI)     ADHD Assessment tools have been completed remotely as in person observations were not possible.     Assessment Results:     Behavioral Observations:  The Patient arrived early for all appointments and scheduled follow-up appointments efficiently. The Patient appeared motivated to complete the assessment and was polite in interactions. He was oriented by three. He appeared to experience difficulty with attention during sessions. The following results are likely to be an accurate reflection of Patient's current functioning.        Isabel Adult ADHD Rating Scale-IV: Self and Other Reports (BAARS-IV)  The BAARS-IV assesses for symptoms of ADHD that are experienced in one's daily life. This assessment measure includes self and collateral rating  "scales designed to provide information regarding current and childhood symptoms of ADHD including inattention, hyperactivity, and impulsivity. Self-report scores are reported as percentiles. Scores at the 76th-83rd percentile are considered marginal, scores at the 84th-92nd percentile are considered borderline, scores at the 93rd-95th percentile are considered mild, scores at the 96th-98th percentile are considered moderate, and those at the 99th percentile are considered severe. Collateral or \"other\" rating scales are reported as number of symptoms observed in comparison to those reported by the client. Norms and percentile scores are not available for collateral reports.      Current Symptoms Scale--Self Report:   Client completed the self-report inventory of current symptoms. The results indicate that the client's Total ADHD Score was 42 which places him in the 97th percentile for overall ADHD symptoms. In addition, the client endorsed 8/9 (99th percentile) Inattention symptoms, 1/9 (86th percentile) Hyperactivity-Impulsivity symptoms, and 7/9 (97th percentile) Sluggish Cognitive Tempo symptoms. Client indicated that the reported symptoms have resulted in impaired functioning in school, home and work. Overall, the results suggest the client is experiencing Moderate ADHD symptoms.      Current Symptoms Scale--Other Report:  Client's friend/coworker completed the collateral report inventory of current symptoms. Based on the collateral contact's observation of symptoms, the client demonstrates 6/9 Inattention symptoms, 0/5 Hyperactivity, 0/4 Impulsivity symptoms, and 1/9 Sluggish Cognitive Tempo symptoms. The client's Total ADHD Score was 39. The collateral contact indicated the client demonstrates impaired functioning in work and social relationships. The collateral- and self-report scores are significantly different for Sluggish Cognitive Tempo and are within normal limits for all other areas.      Current " Symptoms Scale--Other Report:  Client's parents completed the collateral report inventory of current symptoms. Based on the collateral contact's observation of symptoms, the client demonstrates 0/9 Inattention symptoms, 0/5 Hyperactivity, 0/4 Impulsivity symptoms, and 0/9 Sluggish Cognitive Tempo symptoms. The client's Total ADHD Score was 24. The collateral contact indicated the client demonstrates impaired functioning in no areas The collateral- and self-report scores are significantly different for Inattention, Total ADHD, and sluggish cognitive tempo.      Childhood Symptoms Scale--Self-Report:  Client completed the self-report inventory of childhood symptoms. The results indicate that the client's Total ADHD Score was 33 which places him in the 81st percentile for overall ADHD symptoms in childhood. In addition, the client endorsed 5/9 (94th percentile) Inattention symptoms and  0/9 (1st-75th percentile) Hyperactivity-Impulsivity symptoms. Client indicated that the reported symptoms resulted in impaired functioning in school, home and social relationships Overall, the results suggest the client experienced  Borderline to Mild symptoms of ADHD as a child.      Childhood Symptoms Scale--Other Report:  Client's parents completed the collateral report inventory of childhood symptoms. Based on the collateral contact's recollection of client's childhood symptoms, the client demonstrated 1/9 Inattention symptoms and 0/9 Hyperactivity-Impulsivity symptoms. The client's Total ADHD Score was 26. The collateral contact indicated the client demonstrates impaired functioning in no areas. The collateral- and self-report scores are significantly different.                           Isabel Functional Impairment Scale: Self and Other Reports (BFIS)  The BFIS is used to assess an individuals' psychosocial impairment in major life/daily activities that may be due to a mental health disorder. This assessment measure includes self  "and collateral rating scales. Self-report scores are reported as percentiles. Scores at the 76th-83rd percentile are considered marginal, scores at the 84th-92nd percentile are considered borderline, scores at the 93rd-95th percentile are considered mild, scores at the 96th-98th percentile are considered moderate, and those at the 99th percentile are considered severe.Collateral or \"other\" rating scales are reported as number of symptoms observed in comparison to those reported by the client. Norms and percentile scores are not available for collateral reports.      Results indicate the client identified impairment (scores at or greater than 93rd percentile) in the following areas: home-chores, work, social-strangers, driving, daily responsibilities, self-care routines and health maintenance. The client's Mean Impairment Score was 6.5 (98th percentile) indicating the client is reporting Moderate impairment in functioning across domains. Client's friend/coworker completed the collateral rating scale, which indicated discrepant results. The collateral contact's scores were generally lower than the client's report. Client's parents completed the collateral rating scale, which indicated discrepant results. The collateral contact's scores were generally lower than the client's report.        Isabel Deficits in Executive Functioning Scale (BDEFS)  The BDEFS is a measure used for evaluating dimensions of adult executive functioning in daily life.This assessment measure includes self and collateral rating scales. Self-report scores are reported as percentiles. Scores at the 76th-83rd percentile are considered marginal, scores at the 84th-92nd percentile are considered borderline, scores at the 93rd-95th percentile are considered mild, scores at the 96th-98th percentile are considered moderate, and those at the 99th percentile are considered severe.Collateral or \"other\" rating scales are reported as number of symptoms " observed in comparison to those reported by the client. Norms and percentile scores are not available for collateral reports.      Results indicate the client's Total Executive Functioning Score was 240  (99th percentile). The ADHD-Executive Functioning Index score was 30 (99th percentile). These scores suggest the client has Severe deficits in executive functioning. These deficits may be due to ADHD. Results indicate the client identified significant deficits in the following areas: self-management to time 99th , self-organization/problem-solving 97th,  self-restraint 97th, self-motivation 93rd and self-regulation of emotions 97th. Client's friend/coworker completed the collateral rating scale, which indicated similar results. The collateral contact's scores were generally lower than the client's report. Client's parents completed the collateral rating scale, which indicated discrepant results. The collateral contact's scores were generally lower than the client's report.        Summary of MMPI-2 Results      Patient completed the [place] Multiphasic Personality Inventory-2 (MMPI-2), a self-report personality inventory, as a part of the psychological assessment for ruling out Attention Deficit disorders.  Validity scales indicate that the Patient responded in an open and consistent manner, resulting in a valid profile.  His responses yielded a profile that is consistent with people who report difficulty concentrating, depression, anxiety, socially withdrawn, and somatic symptoms.      Patient responded similarly to people who report depression, suicidal ideation, and feelings of unworthiness and inadequacy. They tend to be introverted, feel guilty, and have a slow personal tempo. Moreover, they tend to lack self- confidence and lacks interests. People like this are often submissive, compliant, and emotionally over- controlled. They often are insecure, pessimistic, and have a low self-esteem. They also tend to  experience demoralization that leads to expectations and/or perceptions of failure. People like this often feel uncertain about the future and disinterested in their lives. They feel unsupported by others. They are unhappy, brood, cry easily, and feel hopeless and empty. They may report thoughts of suicide and the wish to be dead. Moreover, they tend to experience low opinions of themselves and do not feel they are liked or are important. They tend to feel unattractive, awkward and clumsy, useless, and like a burden. They also lack self-confidence and find it hard to accept compliments. People like this feel uneasy around others and prefer to be by themselves. They tend to isolate in social situations and view themselves as shy often avoiding social or group situations. People like this are often described as internalizing and introverted. They also tend to lead a careful and cautious lifestyle. Furthermore, they tend to avoid aversive events, are worry-prone, are overly self-critical, catastrophize, and focus on the negative.     Patient s answers are similar to people who report fear, anxiety, tension, intruding thoughts, difficulty concentrating, and obsessions and compulsions.      People who responded similarly to the patient, report somatic symptoms and chronic pain. Moreover, they tend to lack insight concerning causes of the symptoms. They also often have sleep difficulties.     Patient s answers are similar to people who engage in anti-social behaviors and experience trouble with the law. People like this often have difficulty adjusting to life after high school.  Moreover, they tend to have poor concentration, obsessiveness, and indecisiveness. They feel a lack of family support for their career, question their choice of career, and have negative attitudes toward coworkers. Furthermore, they often experience negative attitudes towards doctors and mental health treatment. People like this often feel a  dissatisfaction with their romantic relationship.      Patient responded similarly to people who report feeling confused and experience disorganized thinking. People like this report experiencing hallucinations and/or delusions, and they have impaired contact with reality. Patient responded similarly to people who have brief acute psychotic episodes and are tense, nervous, fearful, depressed, despondent, hopeless, and suicidal. They tend to have blunted or inappropriate affect. Moreover, they tend to experience problems concentrating and attending. People like this are often isolated, shy, withdrawn, introverted, and lack basic social skills. They tend to feel inadequate and inferior, and set high standards for themself and feel guilty when the standards are not met. People like this tend to be interested in obscure subjects.        Results of testing were consistent with his report upon direct interview.            Generalized Anxiety Disorder Questionnaire (SUN-7)  This questionnaire is designed to assess for anxiety in adults.  Based on the score, he is experiencing mild symptoms of anxiety. Client identified the following symptoms of anxiety: feeling on edge/nervous/anxious, difficulty controlling worry, worrying about many different things, trouble relaxing, becoming easily annoyed or irritable and feeling something awful might happen.     Patient Health Questionnaire- 9 (PHQ-9)   This questionnaire is designed to assess for depression in adults.  Based on the score, he is experiencing mild symptoms of depression. Client identified the following symptoms of depression: depressed mood, lack of interest, difficulty with sleep, poor appetite or overeating, feeling bad about self, poor concentration and restlessness or lethargy.              Summary (based on clinical interview, review of records, test results):     Identifying Information:  Patient is a 55 year old,.  The pronoun use throughout this  "assessment reflects the patient's chosen pronoun.  Patient was referred for an assessment by self.  Patient attended the session alone. Patient's current PCP is    Meng Ko DO  Family Medicine           Chief Complaint:   The purpose of this evaluation is to: provide treatment recommendations and clarify diagnosis. Patient reported seeking services at this time for diagnostic assessment and recommendations for treatment.  Patient reported that he has not completed a previous ADHD diagnostic assessment.  Patient has received a previous diagnosis of Depression, Anxiety, Social Anxiety, and Avoidant Personality Disorder. Patient reported that he has never taken psychotropic medication. Patient reported that he saw a therapist in college and he saw a therapist in 2018 for approximately one year through [facility].     Patient reported the following symptoms: difficulty with task completion, motivation to begin tasks, forgetful, and easily distracted. Patient reported that his symptoms began as early as [employer] and worsened in college.         Social/Family History:  Patient reported they grew up in [facility] [place].  They were raised by biological parents  .  Parents were always together.  Patient reported that their childhood was \"fine.\" Patient reported that he was \"bullied a lot\" at school and his parents did not address issues at home. Patient reported that his father disapproved \"of fighting\" so the patient was \"viewed as an easy target.\"  Patient described their current relationships with family of origin as close with his parents. Patient reported that he is the oldest of two children. Patient reported that he speaks with his sister regularly.      Patient described his childhood family environment as nurturing and stable.  As a child, patient reported that he failed to complete assigned chores in the home environment, had problems getting ready for school in the morning, had problems " "with organization and keeping track of items, misplaced or lost things and had problems managing temper with frequent emotional outbursts. Patient reported difficulty with childhood peer relationships. Patient reported that as a child, he would often become distracted with trying to get ready for bed.      The patient describes their cultural background as .  Cultural influences and impact on patient's life structure, values, norms, and healthcare: \"nobody talked about mental health in those days.\" Patient reported that he had a bee sting allergy and so he \"got a shot once a week for about one year.\"  Contextual influences on patient's health include: Family Factors seeks help when needed and Health- Seeking Factors seeks help when needed.    These factors will be addressed in the Preliminary Treatment plan.  Patient identified their preferred language to be English. Patient reported they does not need the assistance of an  or other support involved in therapy.      Patient reported experienced significant delays in developmental tasks, such as attention issues.   Patient's highest education level was some college. Patient reported that he attended approximately three years of college. Patient reported that he did \"fairly well at first\" and then did not go to class and had trouble with concentration. Patient reported that he would \"get a bit behind on the work and then would get freaked out and stop going to the class.\" Patient reported that he was attending the  of  and then \"was asked to leave, because of grades.\" Patient said  \"I had a hard time making myself do the homework and my grades got increasingly worse and worse.\" He reported that he did not do the homework and \"there would be so much that I would be freaked out and then I would stop going to class.\" He reported that he would then fail the class or drop out.  Patient reported that he attended \"some extension classes at the [employer] and " "classes at [employer]\" and he would \"fall apart after a while.\" Patient reported feeling \"pressure\" and that he \"could not keep up\" with assignments in college. Patient reported that he went to college on and off for \"probably for 15 years and then gave up for good.\" Patient reported that he often procrastinated with assignments, felt pressure, and then would \"freak out and stop going to class.\"     Patient identified the following learning problems: attention and concentration.  Modifications will not be used to assist communication in therapy.  Patient reports they are able to understand written materials. Patient did not receive tutoring services during the school years. Patient did not receive special education services. Patient  reported experiencing trouble with completing assignments in high school, but not in college. Patient reported that in high school, he would do most of his work during class.  Patient did attend post secondary school. Patient reported that he went to a public school and in high school, he \"was able to get by without a lot of work.\"     Patient reported the following relationship history no long term partners.  Patient's current relationship status is single for life. Patient identified their sexual orientation as heterosexual.  Patient reported having o child(eloina). Patient identified his parents and sister as part of their support system.  Patient identified the quality of these relationships as good.        Patient's current living/housing situation involves staying in own home/apartment. Patient lives alone and they report that housing is stable.     Patient is currently employed full time.  Patient reports their finances are obtained through employment. 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  The longest period of employment has been 35 years.  Client has been terminated from a place of employment. Patient reported that he was terminated due to mistakes " "due to inattention. Patient does not identify finances as a current stressor.          ADHD Related History:   As a child, Patient reported having sleep disturbance, including: daytime drowsiness / fatigue, insomnia and difficulty stopping reading to go to sleep . Patient reported currently experiencing sleep disturbance, including: insomnia, snoring and teeth grinding.  Client reported sleeping approximately 6.5-7.5 hours per night.  Patient  reported that he has completed a sleep study. Patient reported that the results were inconclusive; he reported that they said he sleeps \"inefficiently.\"  Patient reported having a well balanced diet, cravings for sweets and sometimes binges on sweets. Patient reported that he does not have concerns for his weight or diabetes. There are not significant nutritional concerns.  Patient reported engaging in regular exercise.     Patient graduated high school in 1983 with approximately a 2.7 GPA.  During the elementary, middle, and high school years, patient recalls academic strengths in the area of math. Patient reported experiencing academic problems in no areas recalled.  Patient reported that he tended to complete his work at school.  Patient said \"I have bad working memory.\" Patient reported that he often forgets items at the grocery store. Patient reported that he recently went to see his doctor and forgot to talk about one of his medical issues.      Patient reported that he been frequently late for work, frequently made mistakes with poor attention to detail, often felt bored, often been late in completing projects, disorganized behavior and distractible behavior .  Patient reported that when he started with his employer in the 1980s, his work shift was at 8:00AM. Patient reported that there was not a formal conversation, but due to him routinely showing up later, he usually started around 9:00AM or 9:15AM.      Patient reported that they have not been involved with the " "legal system. Patient does not being under probation/ parole/ jurisdiction. They are not under any current court jurisdiction. .     Patient has received a 's license.  Patient has received moving violations, includin speeding tickets. Patient reported that when he was \"young\" he would drive \"too fast\" his \"attention would wander\" and then he would get into accidents.   Patient reported the following driving habits: fails to obey traffic signs and laws, frequently late for appointments, meetings, or work, gets lost easily and often exceeds the speed limit / speeds.  According to client, he is unsure if people are comfortable riding as passengers when he is driving, because he rarely has passengers in his car.        Patient's Strengths and Limitations:  Patient identified the following strengths or resources that will help them succeed in treatment: commitment to health and well being and friends / good social support. Things that may interfere with the patient's success in treatment include: none identified.        Personal and Family Medical History:  Patient does report a family history of mental health concerns.  Patient reports family history is not on file.     Patient reported that his mother has depression and anxiety. Patient reported that he wonders if his mother has ADHD.     Patient does report Mental Health Diagnosis and/or Treatment.  Patient reported the following previous diagnoses which include(s): Depression, Anxiety, Social Anxiety, and Avoidant Personality Disorder. Patient reported symptoms began in elementary school or [employer].   Patient has received mental health services in the past: therapy with therapist at [facility].  Psychiatric Hospitalizations: None.  Patient denies a history of civil commitment.  Patient is not receiving other mental health services.        Patient has had a physical exam to rule out medical causes for current symptoms.  Date of last physical exam " was within the past year. Client was encouraged to follow up with PCP if symptoms were to develop. The patient has a [facility] Primary Care Provider, who is named:     Meng Ko DO     Family Medicine              Patient reports the following current medical concerns: bladder issue and the following current dental concerns: teeth grinding and his mouth guard is not comfortable.  Patient denies any issues with pain.  There are not significant appetite / nutritional concerns / weight changes.   Patient does not report a history of head injury / trauma / cognitive impairment.       Oxybutynin Chloride      Medication Adherence:  Patient reports taking.     Patient does not have psychotropic medications.      Patient Allergies:  No Known Allergies     Medical History:  No past medical history on file.           Rating Scales:      PHQ 9/7/2021 9/13/2021 1/28/2022   PHQ-9 Total Score 7 9 8   Q9: Thoughts of better off dead/self-harm past 2 weeks Not at all Not at all Not at all     SUN-7 SCORE 9/7/2021 9/13/2021 1/28/2022   Total Score 7 (mild anxiety) 7 (mild anxiety) -   Total Score 7 7 6         Substance Use:  Patient did not report a family history of substance use concerns; see medical history section for details.  Patient has not received chemical dependency treatment in the past.  Patient has not ever been to detox.       Patient is not currently receiving any chemical dependency treatment.               Substance History of use Age of first use Date of last use       Pattern and duration of use (include amounts and frequency)   Alcohol currently use    20 09/02/21 Patient reported that he tends to drink approximately two alcohol drinks every two weeks.       Cannabis    never used     NA      Amphetamines    never used     NA   Cocaine/crack     never used       NA   Hallucinogens never used         NA   Inhalants never used         NA   Heroin never used         NA   Other Opiates never used     NA  "  Benzodiazepine    never used     NA   Barbiturates never used     NA   Over the counter meds used in the past 0 01/01/00    Patient reported that he took over the counter medication as prescribed.   Caffeine currently use 17   Patient reported that he consumes approximately 2 cups of coffee and 3 cups of tea each day.    Nicotine  never used     NA   Other substances not listed above:  Identify:  never used     NA      Patient reported the following problems as a result of their substance use: no problems, not applicable.     CAGE- AID:    CAGE-AID Total Score 9/7/2021   Total Score 1   Total Score MyChart 1 (A total score of 2 or greater is considered clinically significant)         Substance Use: Patient reported that he may have headaches or irritability if he stopped drinking caffeine   .  Based on the negative CAGE score and clinical interview there are not indications of drug or alcohol abuse.         Significant Losses / Trauma / Abuse / Neglect Issues:   Patient did not serve in the .  There are indications or report of significant loss, trauma, abuse or neglect issues related to: bullied in [employer].  Concerns for possible neglect are not present.      Safety Assessment:   Current Safety Concerns:  [place] Suicide Severity Rating Scale (Lifetime/Recent)  [place] Suicide Severity Rating (Lifetime/Recent) 9/7/2021   1. Wish to be Dead (Lifetime) No   Has subject engaged in non-suicidal self-injurious behavior? (Lifetime) No      Patient denies current homicidal ideation and behaviors.  Patient denies current self-injurious ideation and behaviors.    Patient denied risk behaviors associated with substance use.  Patient reports high risk behaviors associated with mental health symptoms, such as driving \"too fast\"  Patient reports the following current concerns for their personal safety: None.  Patient reports there are not firearms in the house.            History of Safety " Concerns:  Patient denied a history of homicidal ideation.     Patient denied a history of personal safety concerns.    Patient denied a history of assaultive behaviors.    Patient denied a history of sexual assault behaviors.     Patient denied a history of risk behaviors associated with substance use.  Patient reported a history of reckless driving associated with mental health symptoms.  Patient reports the following protective factors: effectively controls impulses     Risk Plan:  See Recommendations for Safety and Risk Management Plan     Review of Symptoms per patient report:  Depression:     Change in sleep, Lack of interest, Excessive or inappropriate guilt, Change in energy level, Difficulties concentrating and Change in appetite  Myrna:             Irritability  Psychosis:       No Symptoms  Anxiety:           Excessive worry, Nervousness, Poor concentration and Irritability  Panic:              No symptoms  Post Traumatic Stress Disorder:  No Symptoms   Eating Disorder:          No Symptoms  ADD / ADHD:              Inattentive, Difficulties listening, Poor task completion, Poor organizational skills, Distractibility and Forgetful  Conduct Disorder:       No symptoms  Autism Spectrum Disorder:     Deficits in social communication and social interactions and Deficits in social-emotional reciprocity  Obsessive Compulsive Disorder:       No Symptoms     Patient reports the following compulsive behaviors and treatment history: NA      Diagnostic Criteria:      A. Excessive anxiety and worry about a number of events or activities (such as work or school performance).   B. The person finds it difficult to control the worry.  C. Select 3 or more symptoms (required for diagnosis). Only one item is required in children.   - Being easily fatigued.    - Difficulty concentrating or mind going blank.    - Irritability.   D. The focus of the anxiety and worry is not confined to features of an Axis I disorder.  E. The  anxiety, worry, or physical symptoms cause clinically significant distress or impairment in social, occupational, or other important areas of functioning.   F. The disturbance is not due to the direct physiological effects of a substance (e.g., a drug of abuse, a medication) or a general medical condition (e.g., hyperthyroidism) and does not occur exclusively during a Mood Disorder, a Psychotic Disorder, or a Pervasive Developmental Disorder.    - The aformentioned symptoms began 35 year(s) ago and occurs 7 days per week and is experienced as mild.  A. Depressed mood for most of the day, for more days than not, as indicated either by subjective account or observation by others, for at least 2 years. Note: In children and adolescents, mood can be irritable and duration must be at least 1 year.   B. Presence, while depressed, of two (or more) of the following:        - poor appetite or overeating        - low energy or fatigue        - low self-esteem        - poor concentration or difficulty making decisions   C. During the 2-year period (1 year for children or adolescents) of the disturbance, the person has never been without the symptoms in Criteria A and B for more than 2 months at a time.  D. Criteria for a major depressive disorder may be continuously present for 2 years  E. There has never been a Manic Episode, a Mixed Episode, or a Hypomanic Episode, and criteria have never been met for Cyclothymic Disorder.   F. The disturbance is not better explained by a persistent schizoaffective disorder, schizophrenia, delusional disorder, or other specified or unspecified schizophrenia spectrum and other psychotic disorder  G. The symptoms are not attributable to the physiological effects of a substance (e.g., a drug of abuse, a medication) or another medical condition (e.g., hypothyroidism).   H. The symptoms cause clinically significant distress or impairment in social, occupational, or other important areas of  functioning.        - Early Onset: onset is before age 21 years            Attention Deficit Hyperactivity-Impulsivity Disorder Diagnostic Criteria:      Inattention: Six or more symptoms of inattention for children up to age 16, or five or more for adolescents 17 and older and adults; symptoms of inattention have been present for at least 6 months, and they are inappropriate for developmental level:     -Often fails to give close attention to details or makes careless mistakes in schoolwork, at work, or with other activities. YES  -Often has trouble holding attention on tasks or play activities. YES  -Often does not seem to listen when spoken to directly. YES  -Often does not follow through on instructions and fails to finish schoolwork, chores, or duties in the workplace (e.g., loses focus, side-tracked). YES  -Often has trouble organizing tasks and activities. YES  -Often avoids, dislikes, or is reluctant to do tasks that require mental effort over a long period of time (such as schoolwork or homework).  -Often loses things necessary for tasks and activities (e.g. school materials, pencils, books, tools, wallets, keys, paperwork, eyeglasses, mobile telephones).  -Is often easily distracted YES  -Is often forgetful in daily activities. YES     Hyperactivity and Impulsivity: Six or more symptoms of hyperactivity-impulsivity for children up to age 16, or five or more for adolescents 17 and older and adults; symptoms of hyperactivity-impulsivity have been present for at least 6 months to an extent that is disruptive and inappropriate for the person s developmental level:     -Often fidgets with or taps hands or feet, or squirms in seat.  -Often leaves seat in situations when remaining seated is expected.  -Often runs about or climbs in situations where it is not appropriate (adolescents or adults may be limited to feeling restless).  -Often unable to play or take part in leisure activities quietly.  -Is often  on the  go  acting as if  driven by a motor .  -Often talks excessively.  YES  -Often blurts out an answer before a question has been completed.  -Often has trouble waiting his/her turn.  -Often interrupts or intrudes on others (e.g., butts into conversations or games)     In addition, the following conditions must be met:     -Several inattentive or hyperactive-impulsive symptoms were present before age 12 years.  YES  -Several symptoms are present in two or more setting, (e.g., at home, school or work; with friends or relatives; in other activities).  YES  -There is clear evidence that the symptoms interfere with, or reduce the quality of, social, school, or work functioning.  YES  -The symptoms do not happen only during the course of schizophrenia or another psychotic disorder. The symptoms are not better explained by another mental disorder (e.g. Mood Disorder, Anxiety Disorder, Dissociative Disorder, or a Personality Disorder).  YES           Predominantly Inattentive Presentation: if enough symptoms of inattention, but not hyperactivity-impulsivity, were present for the past six months  YES           Functional Status:  Patient reports the following functional impairments: health maintenance, management of the household and or completion of tasks, operation of a motor vehicle, organization, relationship(s), self-care, social interactions and work / vocational responsibilities.           WHODAS:   WHODAS 2.0 Total Score 9/3/2021   Total Score 24   Total Score MyChart 24         In conclusion, results of testing were significant for inattention, depression, and anxiety. Rating scales suggested that the client is currently experiencing symptoms of an inattention that have been present to some extent since childhood. The client endorsed moderate overall symptoms of inattention and hyperactivity-impulsivity. The client endorsed inattention more often than he did hyperactivity-impulsivity. The client's parents did not  endorse symptoms for his childhood or current abilities. However, the client's co-worker reported inattention for his current abilities.  The client reported scores of moderate functional impairment. This means that the client likely  Has difficulty functioning with his current abilities. He significantly struggles with home-chores, work, social-strangers, driving, daily responsibilities, self-care routines and health maintenance. Additionally, he reported scores of severe executive functioning difficulties. He reported significant deficits for self-management to time, self-organization/problem-solving,  self-restraint, self-motivation, and self-regulation of emotions. Personality testing was suggestive of the client experiencing difficulty with concentration, depression, anxiety, social withdrawal, and somatic symptoms. Additionally, the client is experiencing symptoms of anxiety and depression, which were reported on questionnaires.     Patient meets diagnostic criteria for ADHD as evidenced above per questionnaires and clinical interview of social history.Patient's collateral  for his current functioning indicated mostly similar symptoms. Patient's parents reported significantly fewer symptoms that the patient did for both his current level of functioning and for his childhood. Patient reported that he sees his parents for approximately two hours every two weeks and that his coworker who spends many hours with him each week observes more of his symptoms.     Patient does not currently meet diagnostic criteria for Avoidant Personality Disorder, Schizotypal Personality Disorder, Schizoid Personality Disorder, or Social Anxiety Disorder.           DSM5 Diagnoses: (Sustained by DSM5 Criteria Listed Above)  Diagnoses: Attention-Deficit/Hyperactivity Disorder  314.00 (F90.0) Predominantly inattentive presentation;   300.4 (F34.1) Persistent Depressive Disorder, Early onset  300.02 (F41.1) Generalized Anxiety  Disorder   Defer Schizotypal Personality Disorder, Avoidant Personality Disorder, Insomnia     Psychosocial & Contextual Factors: Work Related Stress        Recommendations:     1. Schedule an appointment with your physician to discuss a medication evaluation.  2. Access resources through websites, books, and articles such as those provided in the handout.  3. Consider working with an ADHD  or individual therapist to learn skills to  assist with symptom management, as well as ways to improve relationships,  etc that may have been impacted by your symptoms.  4. Consider attending workshops or support groups through [facility]  5. Individual therapy is recommended for the treatment of anxiety and depression. Therapies focusing on identifying and challenging problematic thought processes can be beneficial.   6. Schedule a follow-up appointment with me in about six weeks to review symptoms, treatment involvement, and struggles and/or successes. A follow up appointment would be recommended following a feedback session.          Gregoria Mota Psy.D, LP    1/21/2022           Psychological Testing   Billing/Services Summary       Testing Evaluation Services Base: 37597  (1st 60 mins) Add-on: 42245  (each addtl 60 mins)   Record Review and Clarify Referral Question   9/3/2021 3:25-3:30PM 5 minutes   Integration/Report Generation   10/18/21 6:30-7:30PM [facility] (60)  11/2/21 2:10-3:05PM [facility] (55)  11/8/21 11:00-11:45AM, 1/21/2022 7:45-8:00AM  1/28/2022 3:10-3:30PM Integrated Report (80) 195 minutes   Interactive Feedback Session  1/28/2022 1:00-2:00PM 60 minutes   Post-Service Work   1/28/2022 2:45-3:10PM 25 minutes   Total Time: 285 minutes (4 hours, 45 minutes)   Total Units: 1 4           Diagnosis(es): (ICD-10) Attention-Deficit/Hyperactivity Disorder  314.00 (F90.0) Predominantly inattentive presentation;   300.4 (F34.1) Persistent Depressive Disorder, Early onset  300.02 (F41.1)  Generalized Anxiety Disorder              Melolabial Interpolation Flap Text: A decision was made to reconstruct the defect utilizing an interpolation axial flap and a staged reconstruction.  A telfa template was made of the defect.  This telfa template was then used to outline the melolabial interpolation flap.  The donor area for the pedicle flap was then injected with anesthesia.  The flap was excised through the skin and subcutaneous tissue down to the layer of the underlying musculature.  The pedicle flap was carefully excised within this deep plane to maintain its blood supply.  The edges of the donor site were undermined.   The donor site was closed in a primary fashion.  The pedicle was then rotated into position and sutured.  Once the tube was sutured into place, adequate blood supply was confirmed with blanching and refill.  The pedicle was then wrapped with xeroform gauze and dressed appropriately with a telfa and gauze bandage to ensure continued blood supply and protect the attached pedicle.

## 2022-01-29 ASSESSMENT — PATIENT HEALTH QUESTIONNAIRE - PHQ9: SUM OF ALL RESPONSES TO PHQ QUESTIONS 1-9: 8

## 2022-01-29 ASSESSMENT — ANXIETY QUESTIONNAIRES: GAD7 TOTAL SCORE: 6

## 2022-03-16 ENCOUNTER — VIRTUAL VISIT (OUTPATIENT)
Dept: PSYCHOLOGY | Facility: CLINIC | Age: 57
End: 2022-03-16
Payer: COMMERCIAL

## 2022-03-16 DIAGNOSIS — F90.0 ATTENTION DEFICIT HYPERACTIVITY DISORDER (ADHD), PREDOMINANTLY INATTENTIVE TYPE: Primary | ICD-10-CM

## 2022-03-16 DIAGNOSIS — F41.1 GENERALIZED ANXIETY DISORDER: ICD-10-CM

## 2022-03-16 DIAGNOSIS — F34.1: ICD-10-CM

## 2022-03-16 ASSESSMENT — PATIENT HEALTH QUESTIONNAIRE - PHQ9
10. IF YOU CHECKED OFF ANY PROBLEMS, HOW DIFFICULT HAVE THESE PROBLEMS MADE IT FOR YOU TO DO YOUR WORK, TAKE CARE OF THINGS AT HOME, OR GET ALONG WITH OTHER PEOPLE: SOMEWHAT DIFFICULT
SUM OF ALL RESPONSES TO PHQ QUESTIONS 1-9: 8
SUM OF ALL RESPONSES TO PHQ QUESTIONS 1-9: 8

## 2022-03-16 NOTE — PROGRESS NOTES
Patient reported that he was in [place], so this writer explained that we could not do the appointment in any state other than [place]. Patient expressed understanding. Patient will be rescheduled. Patient denied SI.     Patient will not be billed for today.     Gregoria Mota PsyD LP 3/16/2022   Answers for HPI/ROS submitted by the patient on 3/16/2022  If you checked off any problems, how difficult have these problems made it for you to do your work, take care of things at home, or get along with other people?: Somewhat difficult  PHQ9 TOTAL SCORE: 8

## 2022-03-17 ASSESSMENT — PATIENT HEALTH QUESTIONNAIRE - PHQ9: SUM OF ALL RESPONSES TO PHQ QUESTIONS 1-9: 8

## 2022-04-29 ENCOUNTER — OFFICE VISIT (OUTPATIENT)
Dept: URGENT CARE | Facility: URGENT CARE | Age: 57
End: 2022-04-29
Payer: COMMERCIAL

## 2022-04-29 VITALS
WEIGHT: 175 LBS | SYSTOLIC BLOOD PRESSURE: 106 MMHG | OXYGEN SATURATION: 97 % | HEART RATE: 66 BPM | DIASTOLIC BLOOD PRESSURE: 73 MMHG | RESPIRATION RATE: 18 BRPM | TEMPERATURE: 97.2 F

## 2022-04-29 DIAGNOSIS — K14.0 TONGUE INFECTION: Primary | ICD-10-CM

## 2022-04-29 PROCEDURE — 99203 OFFICE O/P NEW LOW 30 MIN: CPT | Performed by: FAMILY MEDICINE

## 2022-04-29 NOTE — PROGRESS NOTES
SUBJECTIVE: Pabol Ferrari is a 56 year old male presenting with a chief complaint of rt sided tongue pain/swelling.  Onset of symptoms was 1 day(s) ago.  Current and Associated symptoms: fatigue  Predisposing factors include possibly bit tongue.    No past medical history on file.  No Known Allergies  Social History     Tobacco Use     Smoking status: Not on file     Smokeless tobacco: Not on file   Substance Use Topics     Alcohol use: Not on file       ROS:  SKIN: no rash  GI: no vomiting    OBJECTIVE:  /73 (BP Location: Left arm, Patient Position: Sitting, Cuff Size: Adult Regular)   Pulse 66   Temp 97.2  F (36.2  C) (Tympanic)   Resp 18   Wt 79.4 kg (175 lb)   SpO2 97% GENERAL APPEARANCE: healthy, alert and no distress  EYES: EOMI,  PERRL, conjunctiva clear  HENT: oral mucous membranes moist, no erythema noted and rt sided tongue red tender/slightly swollen with abrasion  SKIN: no suspicious lesions or rashes      ICD-10-CM    1. Tongue infection  K14.0 amoxicillin-clavulanate (AUGMENTIN) 875-125 MG tablet       Fluids/Rest, f/u if worse/not any better

## 2022-05-11 ENCOUNTER — OFFICE VISIT (OUTPATIENT)
Dept: FAMILY MEDICINE | Facility: CLINIC | Age: 57
End: 2022-05-11
Payer: COMMERCIAL

## 2022-05-11 VITALS
RESPIRATION RATE: 16 BRPM | BODY MASS INDEX: 25.34 KG/M2 | HEART RATE: 61 BPM | TEMPERATURE: 97.3 F | HEIGHT: 71 IN | SYSTOLIC BLOOD PRESSURE: 111 MMHG | WEIGHT: 181 LBS | OXYGEN SATURATION: 98 % | DIASTOLIC BLOOD PRESSURE: 68 MMHG

## 2022-05-11 DIAGNOSIS — F90.0 ATTENTION DEFICIT HYPERACTIVITY DISORDER (ADHD), PREDOMINANTLY INATTENTIVE TYPE: Primary | ICD-10-CM

## 2022-05-11 DIAGNOSIS — K14.8 TONGUE LESION: ICD-10-CM

## 2022-05-11 PROCEDURE — 99214 OFFICE O/P EST MOD 30 MIN: CPT | Mod: 25 | Performed by: PHYSICIAN ASSISTANT

## 2022-05-11 PROCEDURE — 96127 BRIEF EMOTIONAL/BEHAV ASSMT: CPT | Performed by: PHYSICIAN ASSISTANT

## 2022-05-11 RX ORDER — METHYLPHENIDATE HYDROCHLORIDE 10 MG/1
10 CAPSULE, EXTENDED RELEASE ORAL DAILY
Qty: 30 CAPSULE | Refills: 0 | Status: SHIPPED | OUTPATIENT
Start: 2022-05-11 | End: 2022-06-10

## 2022-05-11 ASSESSMENT — PAIN SCALES - GENERAL: PAINLEVEL: MILD PAIN (2)

## 2022-05-11 NOTE — PROGRESS NOTES
"HPI: Pablo is a 55 yo male New pt here for ADHD medication consultation  Also has hx of depression and anxiety as well but feels these are stable  Has never been on a selective serotonin reuptake inhibitor  He tried adderall about 10 years ago but only for 2 days since this made him feel too wired  He has cut back on his caffeine intake to 2-3 per day (coffee and tea)  He has some issues with insomnia since childhood    He works [employer] and has some difficulty focusing at work.    Past Medical History:   Diagnosis Date     Anxiety      Attention deficit hyperactivity disorder (ADHD), predominantly inattentive type      Chronic insomnia      No past surgical history on file.  Social History     Tobacco Use     Smoking status: Never Smoker     Smokeless tobacco: Never Used   Substance Use Topics     Alcohol use: Not on file     Current Outpatient Medications   Medication Sig Dispense Refill     methylphenidate (RITALIN LA) 10 MG 24 hr capsule Take 1 capsule (10 mg) by mouth daily 30 capsule 0     No Known Allergies  FAMILY HISTORY NOTED AND REVIEWED    PHYSICAL EXAM:    /68 (BP Location: Left arm, Patient Position: Sitting, Cuff Size: Adult Regular)   Pulse 61   Temp 97.3  F (36.3  C) (Temporal)   Resp 16   Ht 1.803 m (5' 11\")   Wt 82.1 kg (181 lb)   SpO2 98%   BMI 25.24 kg/m      Patient appears non toxic  Psych: normal affect and mood  Tongue: there is a round ulceration lesion R lateral tongue measuring about 5mm    Assessment and Plan:     (F90.0) Attention deficit hyperactivity disorder (ADHD), predominantly inattentive type  (primary encounter diagnosis)  Comment: discussed this is a stimulant that could make sleep and anxiety worse.  Discussed these medications are potentially habit forming. F/u one month for recheck. Est care with Dr. maloney one month  Plan: methylphenidate (RITALIN LA) 10 MG 24 hr         capsule        Qam    (K14.8) Tongue lesion  Comment: pt has a new ulcerated lesion on " lateral tongue for almost a month. Referred to ENT  Plan: Otolaryngology Referral    Sarah Viera PA-C

## 2022-05-12 PROBLEM — F51.04 CHRONIC INSOMNIA: Status: ACTIVE | Noted: 2022-05-12

## 2022-05-12 PROBLEM — F90.0 ATTENTION DEFICIT HYPERACTIVITY DISORDER (ADHD), PREDOMINANTLY INATTENTIVE TYPE: Status: ACTIVE | Noted: 2022-05-12

## 2022-06-10 ENCOUNTER — OFFICE VISIT (OUTPATIENT)
Dept: FAMILY MEDICINE | Facility: CLINIC | Age: 57
End: 2022-06-10
Payer: COMMERCIAL

## 2022-06-10 VITALS
SYSTOLIC BLOOD PRESSURE: 110 MMHG | HEART RATE: 51 BPM | HEIGHT: 71 IN | RESPIRATION RATE: 16 BRPM | DIASTOLIC BLOOD PRESSURE: 67 MMHG | BODY MASS INDEX: 24.92 KG/M2 | TEMPERATURE: 97.5 F | OXYGEN SATURATION: 98 % | WEIGHT: 178 LBS

## 2022-06-10 DIAGNOSIS — F90.0 ATTENTION DEFICIT HYPERACTIVITY DISORDER (ADHD), PREDOMINANTLY INATTENTIVE TYPE: ICD-10-CM

## 2022-06-10 PROCEDURE — 99207 PR NO CHARGE LOS: CPT | Performed by: INTERNAL MEDICINE

## 2022-06-10 RX ORDER — METHYLPHENIDATE HYDROCHLORIDE 10 MG/1
10 CAPSULE, EXTENDED RELEASE ORAL DAILY
Qty: 30 CAPSULE | Refills: 0 | Status: CANCELLED | OUTPATIENT
Start: 2022-06-10

## 2022-06-10 RX ORDER — METHYLPHENIDATE HYDROCHLORIDE 10 MG/1
10 CAPSULE, EXTENDED RELEASE ORAL DAILY
Qty: 30 CAPSULE | Refills: 0 | Status: SHIPPED | OUTPATIENT
Start: 2022-08-10

## 2022-06-10 RX ORDER — METHYLPHENIDATE HYDROCHLORIDE 10 MG/1
10 CAPSULE, EXTENDED RELEASE ORAL DAILY
Qty: 30 CAPSULE | Refills: 0 | Status: SHIPPED | OUTPATIENT
Start: 2022-06-10

## 2022-06-10 RX ORDER — METHYLPHENIDATE HYDROCHLORIDE 10 MG/1
10 CAPSULE, EXTENDED RELEASE ORAL DAILY
Qty: 30 CAPSULE | Refills: 0 | Status: SHIPPED | OUTPATIENT
Start: 2022-07-10

## 2022-06-10 ASSESSMENT — PAIN SCALES - GENERAL: PAINLEVEL: NO PAIN (0)

## 2022-06-10 NOTE — PATIENT INSTRUCTIONS
[facility]: [phone]    [facility]: [phone]    Both of these groups including psychiatrists/nurse practioners for medication management AND therapists for couseling

## 2022-06-10 NOTE — PROGRESS NOTES
"  Assessment & Plan     Attention deficit hyperactivity disorder (ADHD), predominantly inattentive type  Discussed best for ongoing management to be through psychiatrist/mental health provider. Options for community psychiatrists provided. He is agreeable. Three months of temporary rx's provided to avoid gap in care.    - methylphenidate (RITALIN LA) 10 MG 24 hr capsule  Dispense: 30 capsule; Refill: 0  - methylphenidate (RITALIN LA) 10 MG 24 hr capsule  Dispense: 30 capsule; Refill: 0  - methylphenidate (RITALIN LA) 10 MG 24 hr capsule  Dispense: 30 capsule; Refill: 0        Return if symptoms worsen or fail to improve.    Nishi Pascal DO  [facility] NATHALIE Shah is a 56 year old who presents for the following health issues     HPI     Pablo is here for refills on his ADHD medication.    Also has hx of depression and anxiety, and insomnia as a child  Has never been on a selective serotonin reuptake inhibitor  Evaluated by therapist  Feels it works \"ok\" for him    Review of Systems   Constitutional, HEENT, cardiovascular, pulmonary, gi and gu systems are negative, except as otherwise noted.      Objective    /67 (BP Location: Left arm, Patient Position: Sitting, Cuff Size: Adult Regular)   Pulse 51   Temp 97.5  F (36.4  C)   Resp 16   Ht 1.803 m (5' 11\")   Wt 80.7 kg (178 lb)   SpO2 98%   BMI 24.83 kg/m    Body mass index is 24.83 kg/m .  Physical Exam     GENERAL APPEARANCE: AAOx3, no distress. Well developed.    PSYCH: appropriate mood and affect.               "

## 2022-07-07 DIAGNOSIS — F90.0 ATTENTION DEFICIT HYPERACTIVITY DISORDER (ADHD), PREDOMINANTLY INATTENTIVE TYPE: Primary | ICD-10-CM

## 2022-09-24 ENCOUNTER — HEALTH MAINTENANCE LETTER (OUTPATIENT)
Age: 57
End: 2022-09-24

## 2022-12-03 ENCOUNTER — APPOINTMENT (OUTPATIENT)
Dept: GENERAL RADIOLOGY | Facility: CLINIC | Age: 57
End: 2022-12-03
Attending: EMERGENCY MEDICINE
Payer: COMMERCIAL

## 2022-12-03 ENCOUNTER — HOSPITAL ENCOUNTER (EMERGENCY)
Facility: CLINIC | Age: 57
Discharge: HOME | End: 2022-12-03
Attending: EMERGENCY MEDICINE | Admitting: EMERGENCY MEDICINE
Payer: COMMERCIAL

## 2022-12-03 VITALS
OXYGEN SATURATION: 99 % | HEART RATE: 48 BPM | RESPIRATION RATE: 20 BRPM | TEMPERATURE: 98.3 F | SYSTOLIC BLOOD PRESSURE: 128 MMHG | DIASTOLIC BLOOD PRESSURE: 76 MMHG

## 2022-12-03 DIAGNOSIS — R07.9 CHEST PAIN, UNSPECIFIED TYPE: ICD-10-CM

## 2022-12-03 LAB
ANION GAP SERPL CALCULATED.3IONS-SCNC: 5 MMOL/L (ref 3–14)
BASOPHILS # BLD AUTO: 0 10E3/UL (ref 0–0.2)
BASOPHILS NFR BLD AUTO: 0 %
BUN SERPL-MCNC: 17 MG/DL (ref 7–30)
CALCIUM SERPL-MCNC: 8.8 MG/DL (ref 8.5–10.1)
CHLORIDE BLD-SCNC: 106 MMOL/L (ref 94–109)
CO2 SERPL-SCNC: 26 MMOL/L (ref 20–32)
CREAT SERPL-MCNC: 1.09 MG/DL (ref 0.66–1.25)
EOSINOPHIL # BLD AUTO: 0 10E3/UL (ref 0–0.7)
EOSINOPHIL NFR BLD AUTO: 1 %
ERYTHROCYTE [DISTWIDTH] IN BLOOD BY AUTOMATED COUNT: 13 % (ref 10–15)
GFR SERPL CREATININE-BSD FRML MDRD: 79 ML/MIN/1.73M2
GLUCOSE BLD-MCNC: 81 MG/DL (ref 70–99)
HCT VFR BLD AUTO: 40.1 % (ref 40–53)
HGB BLD-MCNC: 14.2 G/DL (ref 13.3–17.7)
IMM GRANULOCYTES # BLD: 0 10E3/UL
IMM GRANULOCYTES NFR BLD: 0 %
LYMPHOCYTES # BLD AUTO: 3.5 10E3/UL (ref 0.8–5.3)
LYMPHOCYTES NFR BLD AUTO: 45 %
MCH RBC QN AUTO: 32.2 PG (ref 26.5–33)
MCHC RBC AUTO-ENTMCNC: 35.4 G/DL (ref 31.5–36.5)
MCV RBC AUTO: 91 FL (ref 78–100)
MONOCYTES # BLD AUTO: 0.6 10E3/UL (ref 0–1.3)
MONOCYTES NFR BLD AUTO: 8 %
NEUTROPHILS # BLD AUTO: 3.6 10E3/UL (ref 1.6–8.3)
NEUTROPHILS NFR BLD AUTO: 46 %
NRBC # BLD AUTO: 0 10E3/UL
NRBC BLD AUTO-RTO: 0 /100
PLATELET # BLD AUTO: 222 10E3/UL (ref 150–450)
POTASSIUM BLD-SCNC: 3.5 MMOL/L (ref 3.4–5.3)
RBC # BLD AUTO: 4.41 10E6/UL (ref 4.4–5.9)
SODIUM SERPL-SCNC: 137 MMOL/L (ref 133–144)
TROPONIN I SERPL HS-MCNC: 4 NG/L
TROPONIN I SERPL HS-MCNC: 5 NG/L
WBC # BLD AUTO: 7.7 10E3/UL (ref 4–11)

## 2022-12-03 PROCEDURE — 93005 ELECTROCARDIOGRAM TRACING: CPT

## 2022-12-03 PROCEDURE — 85025 COMPLETE CBC W/AUTO DIFF WBC: CPT | Performed by: EMERGENCY MEDICINE

## 2022-12-03 PROCEDURE — 85004 AUTOMATED DIFF WBC COUNT: CPT | Performed by: EMERGENCY MEDICINE

## 2022-12-03 PROCEDURE — 84484 ASSAY OF TROPONIN QUANT: CPT | Performed by: EMERGENCY MEDICINE

## 2022-12-03 PROCEDURE — 80048 BASIC METABOLIC PNL TOTAL CA: CPT | Performed by: EMERGENCY MEDICINE

## 2022-12-03 PROCEDURE — 250N000013 HC RX MED GY IP 250 OP 250 PS 637: Performed by: EMERGENCY MEDICINE

## 2022-12-03 PROCEDURE — 99285 EMERGENCY DEPT VISIT HI MDM: CPT | Mod: 25

## 2022-12-03 PROCEDURE — 36415 COLL VENOUS BLD VENIPUNCTURE: CPT | Performed by: EMERGENCY MEDICINE

## 2022-12-03 PROCEDURE — 250N000009 HC RX 250: Performed by: EMERGENCY MEDICINE

## 2022-12-03 PROCEDURE — 71046 X-RAY EXAM CHEST 2 VIEWS: CPT

## 2022-12-03 RX ADMIN — ALUMINUM HYDROXIDE, MAGNESIUM HYDROXIDE, AND DIMETHICONE 30 ML: 200; 20; 200 SUSPENSION ORAL at 20:47

## 2022-12-03 ASSESSMENT — ENCOUNTER SYMPTOMS
VOMITING: 0
SHORTNESS OF BREATH: 0
FEVER: 0
FLANK PAIN: 0
ABDOMINAL PAIN: 0
HEADACHES: 0

## 2022-12-03 ASSESSMENT — ACTIVITIES OF DAILY LIVING (ADL): ADLS_ACUITY_SCORE: 35

## 2022-12-03 NOTE — ED TRIAGE NOTES
Pt presents to ED for c/o chest pressure. It has been ongoing for several months but noticed it was worse last night. Mid sternal, does not radiate. States it feels similar to acid reflux that he has had in the past     Triage Assessment     Row Name 12/03/22 [id number]       Triage Assessment (Adult)    Airway WDL WDL       Respiratory WDL    Respiratory WDL WDL       Skin Circulation/Temperature WDL    Skin Circulation/Temperature WDL WDL       Cardiac WDL    Cardiac WDL X;chest pain       Chest Pain Assessment    Chest Pain Location midsternal    Character pressure    Duration Since yesterday    Precipitating Factors at rest       Peripheral/Neurovascular WDL    Peripheral Neurovascular WDL WDL       Cognitive/Neuro/Behavioral WDL    Cognitive/Neuro/Behavioral WDL WDL

## 2022-12-04 NOTE — ED PROVIDER NOTES
History   Chief Complaint:  Chest Pain    HPI   Pablo Ferrari is a 56 year old male with history of GERD who presents with chest pain.  It has been an ongoing issue for several weeks and he thought it was worse last night after eating Chipotle and drank some etoh as well. He takes Prilosec daily.  He has had an endoscopy in the past.  He had a similar presentation 15 years ago and was told was not cardiac.  He denies any fevers or cough.  No history of ulcerative colitis or Crohn's.  No nausea, vomiting or diarrhea.  The chest pain is on the left side only.  It feels like a burning.  It does not radiate to the arm, back or jaw.    PE/DVT Risk Factors:  The patient denies a personal or family history of PE, DVT, or other clotting disorders. The patient denies any recent travel, surgery, or other prolonged immobilization. The patient denies tobacco use or hormone use.    Cardiac Risk Factors:  The patient denies a history of diabetes, hypertension, high cholesterol, known cardiac disease or current smoking.    Review of Systems   Constitutional: Negative for fever.   Respiratory: Negative for shortness of breath.    Cardiovascular: Positive for chest pain. Negative for leg swelling.   Gastrointestinal: Negative for abdominal pain and vomiting.   Genitourinary: Negative for flank pain.   Skin: Negative for rash.   Neurological: Negative for headaches.   All other systems reviewed and are negative.    Allergies:  No Known Allergies    Medications:  Gemtesa   Meloxicam  Priolosec   Ritalin     Past Medical History:     Anxiety   Avoidant personality disorder  Major depressive disorder   Social anxiety disorder  ADHD   Chronic insomnia     Social History:  The patient presents to the ED by means of car.   PCP: Nishi Pascal     Physical Exam     Patient Vitals for the past 24 hrs:   BP Temp Temp src Pulse Resp SpO2   12/03/22 1630 124/76 98.3  F (36.8  C) Temporal 63 20 100 %       Physical Exam  Physical Exam   General:   Sitting on bed by self.   HENT:  No obvious trauma to head  Right Ear:  External ear normal.   Left Ear:  External ear normal.   Nose:  Nose normal.   Eyes:  Conjunctivae and EOM are normal. Pupils are equal, round, and reactive.   Neck: Normal range of motion. Neck supple. No tracheal deviation present.   CV:  Normal heart sounds. No murmur heard.  Pulm/Chest: Effort normal and breath sounds normal.   Abd: Soft. No distension. There is no tenderness. There is no rigidity, no rebound and no guarding.   M/S: Normal range of motion. no calf pain or swelling.  Neuro: Alert. GCS 15.  Skin: Skin is warm and dry. No rash noted. Not diaphoretic.   Psych: Normal mood and affect. Behavior is normal.     Emergency Department Course   ECG  No results found for this or any previous visit.    Imaging:  XR Chest 2 Views   Final Result   IMPRESSION: Negative chest.        Report per radiology    Laboratory:  Labs Ordered and Resulted from Time of ED Arrival to Time of ED Departure   BASIC METABOLIC PANEL - Normal       Result Value    Sodium 137      Potassium 3.5      Chloride 106      Carbon Dioxide (CO2) 26      Anion Gap 5      Urea Nitrogen 17      Creatinine 1.09      Calcium 8.8      Glucose 81      GFR Estimate 79     TROPONIN I - Normal    Troponin I High Sensitivity 4     TROPONIN I - Normal    Troponin I High Sensitivity 5     CBC WITH PLATELETS AND DIFFERENTIAL    WBC Count 7.7      RBC Count 4.41      Hemoglobin 14.2      Hematocrit 40.1      MCV 91      MCH 32.2      MCHC 35.4      RDW 13.0      Platelet Count 222      % Neutrophils 46      % Lymphocytes 45      % Monocytes 8      % Eosinophils 1      % Basophils 0      % Immature Granulocytes 0      NRBCs per 100 WBC 0      Absolute Neutrophils 3.6      Absolute Lymphocytes 3.5      Absolute Monocytes 0.6      Absolute Eosinophils 0.0      Absolute Basophils 0.0      Absolute Immature Granulocytes 0.0      Absolute NRBCs 0.0        Emergency Department  Course:  Reviewed:  I reviewed nursing notes, vitals and past medical history    Assessments:  1935 I obtained history and examined the patient as noted above.   2125 I rechecked the patient and explained findings.  GI cocktail helped somewhat.    Interventions:  Medications   lidocaine (viscous) (XYLOCAINE) 2 % 15 mL, alum & mag hydroxide-simethicone (MAALOX) 15 mL GI Cocktail (30 mLs Oral Given 12/3/22 2047)     Disposition:  The patient was discharged to home.     Impression & Plan   Medical Decision Making:  Pablo Ferrari is a very pleasant 56 year old year old patient who presents to the emergency department with concern of chest pain.  It has been ongoing for several weeks.  He has a history of heartburn.  This felt like heartburn, but slightly worse.  He describes it as a pressure.  Screening EKG has no acute ST changes.  At this time I do not suspect that there is an acute/dangerous pathology for the chest pain.  They have no significant personal/family history of cardiac disease. They have no significant cardiac risk factors.  The EKG was reviewed and shows no significant ST changes and no evidence of Brugada syndrome, Keenan-Parkinson-White, hypertrophic cardiomyopathy or prolonged QTc syndrome. The chest xray was reviewed and shows no evidence of pneumothorax, infiltrate to suggest pneumonia, widened mediastinum to suggest aortic dissection, obvious rib fracture or free air under the diaphragm to suggest perforated viscous ulcer. Their troponin was negative after several days of symptoms, but a delta troponin was obtained secondary to the symptoms worsening today and this returned negative as well. The patient does not smoke and other than age is otherwise PERC negative.  Doubt pulmonary embolism.  The patient has not had recent fevers or viral infections to suggest pericarditis. They have not had recent chest trauma.  All of this was discussed with the patient and they were reassurred.  I reviewed the risk  and benefit of outpatient stress test and after doing so the patient declined.  I have advised them to follow-up with their PCP in the next 3 days to get further evaluation, and to return to the ED sooner if their chest pain continues/worsens, they develop severe SOB/fevers/lightheadedness, or they develop any other new and concerning symptoms. At this point the patient is stable and appropriate for discharge. Pts heart score is 1, placing him at low risk.  I offered to order an outpatient stress test, but the patient declined and agrees to follow-up with his PCP to further discuss this or possible GI referral as well.    The treatment plan was discussed with the patient and they expressed understanding of this plan and consented to the plan.  In addition, the patient will return to the emergency department if their symptoms persist, worsen, if new symptoms arise or if there is any concern as other pathology may be present that is not evident at this time. They also understand the importance of close follow up in the clinic and if unable to do so will return to the emergency department for a reevaluation. All questions were answered.     Diagnosis:    ICD-10-CM    1. Chest pain, unspecified type  R07.9           Discharge Medications:  New Prescriptions    No medications on file       Scribe Disclosure:  YUMIKO, Joshua Kjer, am serving as a scribe at 7:35 PM on 12/3/2022 to document services personally performed by Jimmy Woods DO based on my observations and the provider's statements to me.        Jimmy Woods DO  12/03/22 0195

## 2022-12-05 LAB
ATRIAL RATE - MUSE: 60 BPM
DIASTOLIC BLOOD PRESSURE - MUSE: NORMAL MMHG
INTERPRETATION ECG - MUSE: NORMAL
P AXIS - MUSE: 28 DEGREES
PR INTERVAL - MUSE: 156 MS
QRS DURATION - MUSE: 92 MS
QT - MUSE: 436 MS
QTC - MUSE: 436 MS
R AXIS - MUSE: 23 DEGREES
SYSTOLIC BLOOD PRESSURE - MUSE: NORMAL MMHG
T AXIS - MUSE: 60 DEGREES
VENTRICULAR RATE- MUSE: 60 BPM

## 2023-01-29 ENCOUNTER — HEALTH MAINTENANCE LETTER (OUTPATIENT)
Age: 58
End: 2023-01-29

## 2024-03-02 ENCOUNTER — HEALTH MAINTENANCE LETTER (OUTPATIENT)
Age: 59
End: 2024-03-02

## 2025-03-15 ENCOUNTER — HEALTH MAINTENANCE LETTER (OUTPATIENT)
Age: 60
End: 2025-03-15